# Patient Record
Sex: FEMALE | Race: WHITE | NOT HISPANIC OR LATINO | URBAN - METROPOLITAN AREA
[De-identification: names, ages, dates, MRNs, and addresses within clinical notes are randomized per-mention and may not be internally consistent; named-entity substitution may affect disease eponyms.]

---

## 2021-02-23 ENCOUNTER — APPOINTMENT (RX ONLY)
Dept: URBAN - METROPOLITAN AREA CLINIC 114 | Facility: CLINIC | Age: 61
Setting detail: DERMATOLOGY
End: 2021-02-23

## 2021-02-23 VITALS — TEMPERATURE: 97.2 F

## 2021-02-23 DIAGNOSIS — L85.3 XEROSIS CUTIS: ICD-10-CM

## 2021-02-23 DIAGNOSIS — L82.0 INFLAMED SEBORRHEIC KERATOSIS: ICD-10-CM

## 2021-02-23 DIAGNOSIS — L82.1 OTHER SEBORRHEIC KERATOSIS: ICD-10-CM

## 2021-02-23 DIAGNOSIS — L81.4 OTHER MELANIN HYPERPIGMENTATION: ICD-10-CM

## 2021-02-23 DIAGNOSIS — D18.0 HEMANGIOMA: ICD-10-CM

## 2021-02-23 PROBLEM — D18.01 HEMANGIOMA OF SKIN AND SUBCUTANEOUS TISSUE: Status: ACTIVE | Noted: 2021-02-23

## 2021-02-23 PROCEDURE — ? LIQUID NITROGEN

## 2021-02-23 PROCEDURE — 99203 OFFICE O/P NEW LOW 30 MIN: CPT | Mod: 25

## 2021-02-23 PROCEDURE — ? TREATMENT REGIMEN

## 2021-02-23 PROCEDURE — ? COUNSELING

## 2021-02-23 PROCEDURE — 17110 DESTRUCTION B9 LES UP TO 14: CPT

## 2021-02-23 ASSESSMENT — LOCATION SIMPLE DESCRIPTION DERM
LOCATION SIMPLE: LEFT LOWER BACK
LOCATION SIMPLE: LEFT THIGH
LOCATION SIMPLE: RIGHT POSTERIOR UPPER ARM
LOCATION SIMPLE: LEFT ANTERIOR NECK
LOCATION SIMPLE: LEFT POSTERIOR UPPER ARM
LOCATION SIMPLE: UPPER BACK
LOCATION SIMPLE: LEFT SHOULDER

## 2021-02-23 ASSESSMENT — LOCATION DETAILED DESCRIPTION DERM
LOCATION DETAILED: SUPERIOR THORACIC SPINE
LOCATION DETAILED: LEFT ANTERIOR DISTAL THIGH
LOCATION DETAILED: LEFT SUPERIOR MEDIAL MIDBACK
LOCATION DETAILED: LEFT INFERIOR ANTERIOR NECK
LOCATION DETAILED: LEFT POSTERIOR SHOULDER
LOCATION DETAILED: RIGHT PROXIMAL POSTERIOR UPPER ARM
LOCATION DETAILED: LEFT PROXIMAL POSTERIOR UPPER ARM

## 2021-02-23 ASSESSMENT — LOCATION ZONE DERM
LOCATION ZONE: ARM
LOCATION ZONE: TRUNK
LOCATION ZONE: LEG
LOCATION ZONE: NECK

## 2021-02-23 NOTE — PROCEDURE: LIQUID NITROGEN
Include Z78.9 (Other Specified Conditions Influencing Health Status) As An Associated Diagnosis?: No
Post-Care Instructions: I reviewed with the patient in detail post-care instructions. Patient is to wear sunprotection, and avoid picking at any of the treated lesions. Pt may apply Vaseline to crusted or scabbing areas.
Number Of Freeze-Thaw Cycles: 2 freeze-thaw cycles
Medical Necessity Information: It is in your best interest to select a reason for this procedure from the list below. All of these items fulfill various CMS LCD requirements except the new and changing color options.
Medical Necessity Clause: This procedure was medically necessary because the lesions are contagious and infectious
Detail Level: Simple
Duration Of Freeze Thaw-Cycle (Seconds): 5-10
Consent: The patient's consent was obtained including but not limited to risks of crusting, scabbing, blistering, scarring, darker or lighter pigmentary change, recurrence, incomplete removal and infection.
Render Post-Care Instructions In Note?: yes

## 2021-05-10 ENCOUNTER — HOSPITAL ENCOUNTER (OUTPATIENT)
Dept: GENERAL RADIOLOGY | Age: 61
Discharge: HOME | End: 2021-05-10
Payer: COMMERCIAL

## 2021-05-10 ENCOUNTER — HOSPITAL ENCOUNTER (OUTPATIENT)
Age: 61
Discharge: HOME | End: 2021-05-10
Payer: COMMERCIAL

## 2021-05-10 ENCOUNTER — OFFICE VISIT (OUTPATIENT)
Dept: RHEUMATOLOGY | Age: 61
End: 2021-05-10
Payer: COMMERCIAL

## 2021-05-10 ENCOUNTER — NURSE ONLY (OUTPATIENT)
Dept: LAB | Age: 61
End: 2021-05-10

## 2021-05-10 VITALS
HEIGHT: 61 IN | DIASTOLIC BLOOD PRESSURE: 76 MMHG | WEIGHT: 129 LBS | OXYGEN SATURATION: 97 % | BODY MASS INDEX: 24.35 KG/M2 | HEART RATE: 58 BPM | SYSTOLIC BLOOD PRESSURE: 128 MMHG

## 2021-05-10 DIAGNOSIS — M06.9 RHEUMATOID ARTHRITIS INVOLVING MULTIPLE SITES, UNSPECIFIED WHETHER RHEUMATOID FACTOR PRESENT (HCC): ICD-10-CM

## 2021-05-10 DIAGNOSIS — Z51.81 MEDICATION MONITORING ENCOUNTER: ICD-10-CM

## 2021-05-10 DIAGNOSIS — M06.30 RHEUMATOID NODULOSIS (HCC): ICD-10-CM

## 2021-05-10 DIAGNOSIS — M06.9 RHEUMATOID ARTHRITIS INVOLVING MULTIPLE SITES, UNSPECIFIED WHETHER RHEUMATOID FACTOR PRESENT (HCC): Primary | ICD-10-CM

## 2021-05-10 DIAGNOSIS — M17.0 OSTEOARTHRITIS OF BOTH KNEES, UNSPECIFIED OSTEOARTHRITIS TYPE: ICD-10-CM

## 2021-05-10 LAB
ALBUMIN SERPL-MCNC: 4.3 G/DL (ref 3.5–5.1)
ALP BLD-CCNC: 80 U/L (ref 38–126)
ALT SERPL-CCNC: 47 U/L (ref 11–66)
ANION GAP SERPL CALCULATED.3IONS-SCNC: 9 MEQ/L (ref 8–16)
AST SERPL-CCNC: 14 U/L (ref 5–40)
BASOPHILS # BLD: 0.4 %
BASOPHILS ABSOLUTE: 0 THOU/MM3 (ref 0–0.1)
BILIRUB SERPL-MCNC: 0.5 MG/DL (ref 0.3–1.2)
BUN BLDV-MCNC: 22 MG/DL (ref 7–22)
C-REACTIVE PROTEIN: < 0.3 MG/DL (ref 0–1)
CALCIUM SERPL-MCNC: 9.8 MG/DL (ref 8.5–10.5)
CHLORIDE BLD-SCNC: 103 MEQ/L (ref 98–111)
CO2: 29 MEQ/L (ref 23–33)
CREAT SERPL-MCNC: 0.7 MG/DL (ref 0.4–1.2)
EOSINOPHIL # BLD: 2.8 %
EOSINOPHILS ABSOLUTE: 0.2 THOU/MM3 (ref 0–0.4)
ERYTHROCYTE [DISTWIDTH] IN BLOOD BY AUTOMATED COUNT: 12.3 % (ref 11.5–14.5)
ERYTHROCYTE [DISTWIDTH] IN BLOOD BY AUTOMATED COUNT: 45.7 FL (ref 35–45)
GFR SERPL CREATININE-BSD FRML MDRD: 84 ML/MIN/1.73M2
GLUCOSE BLD-MCNC: 85 MG/DL (ref 70–108)
HAV IGM SER IA-ACNC: NEGATIVE
HCT VFR BLD CALC: 44.4 % (ref 37–47)
HEMOGLOBIN: 13.9 GM/DL (ref 12–16)
HEPATITIS B CORE IGM ANTIBODY: NEGATIVE
HEPATITIS B SURFACE ANTIGEN: NEGATIVE
HEPATITIS C ANTIBODY: NEGATIVE
IMMATURE GRANS (ABS): 0.02 THOU/MM3 (ref 0–0.07)
IMMATURE GRANULOCYTES: 0.2 %
LYMPHOCYTES # BLD: 36.9 %
LYMPHOCYTES ABSOLUTE: 3.1 THOU/MM3 (ref 1–4.8)
MCH RBC QN AUTO: 31.2 PG (ref 26–33)
MCHC RBC AUTO-ENTMCNC: 31.3 GM/DL (ref 32.2–35.5)
MCV RBC AUTO: 99.8 FL (ref 81–99)
MONOCYTES # BLD: 6.9 %
MONOCYTES ABSOLUTE: 0.6 THOU/MM3 (ref 0.4–1.3)
NUCLEATED RED BLOOD CELLS: 0 /100 WBC
PLATELET # BLD: 360 THOU/MM3 (ref 130–400)
PMV BLD AUTO: 10 FL (ref 9.4–12.4)
POTASSIUM SERPL-SCNC: 4 MEQ/L (ref 3.5–5.2)
RBC # BLD: 4.45 MILL/MM3 (ref 4.2–5.4)
SEDIMENTATION RATE, ERYTHROCYTE: 10 MM/HR (ref 0–20)
SEG NEUTROPHILS: 52.8 %
SEGMENTED NEUTROPHILS ABSOLUTE COUNT: 4.4 THOU/MM3 (ref 1.8–7.7)
SODIUM BLD-SCNC: 141 MEQ/L (ref 135–145)
TOTAL PROTEIN: 6.9 G/DL (ref 6.1–8)
WBC # BLD: 8.3 THOU/MM3 (ref 4.8–10.8)

## 2021-05-10 PROCEDURE — 99244 OFF/OP CNSLTJ NEW/EST MOD 40: CPT | Performed by: NURSE PRACTITIONER

## 2021-05-10 PROCEDURE — 73130 X-RAY EXAM OF HAND: CPT

## 2021-05-10 PROCEDURE — 73630 X-RAY EXAM OF FOOT: CPT

## 2021-05-10 PROCEDURE — 71046 X-RAY EXAM CHEST 2 VIEWS: CPT

## 2021-05-10 SDOH — HEALTH STABILITY: MENTAL HEALTH: HOW OFTEN DO YOU HAVE A DRINK CONTAINING ALCOHOL?: NEVER

## 2021-05-10 ASSESSMENT — ENCOUNTER SYMPTOMS
NAUSEA: 0
BACK PAIN: 0
ABDOMINAL PAIN: 0
SHORTNESS OF BREATH: 0
TROUBLE SWALLOWING: 0
DIARRHEA: 0
COUGH: 0
EYE ITCHING: 0
EYE PAIN: 0
CONSTIPATION: 0

## 2021-05-11 ENCOUNTER — TELEPHONE (OUTPATIENT)
Dept: RHEUMATOLOGY | Age: 61
End: 2021-05-11

## 2021-05-11 LAB — RHEUMATOID FACTOR: 219 IU/ML (ref 0–13)

## 2021-05-11 NOTE — TELEPHONE ENCOUNTER
----- Message from BANDAR García CNP sent at 5/11/2021 12:52 PM EDT -----  Xray right foot also with erosions, soft tissue swelling, bone thinning, small heel spur.

## 2021-05-11 NOTE — TELEPHONE ENCOUNTER
----- Message from BANDAR García CNP sent at 5/11/2021 12:53 PM EDT -----  [place]f [place] of the hands with previous surgical changes, erosions, and bone thinning.

## 2021-05-11 NOTE — TELEPHONE ENCOUNTER
----- Message from BANDAR Cruz - CNP sent at 5/11/2021 12:51 PM EDT -----  Annel Ramirez of left foot with erosions, soft tissue swelling, and bone thinning which can all be seen with rheumatoid arthritis.

## 2021-05-11 NOTE — TELEPHONE ENCOUNTER
----- Message from BANDAR Hernandez CNP sent at 5/11/2021 12:46 PM EDT -----  Chest xray with no abnormalities.

## 2021-05-11 NOTE — TELEPHONE ENCOUNTER
----- Message from BANDAR Rousseau CNP sent at 5/11/2021 12:50 PM EDT -----  One of the blood tests for rheumatoid arthritis was positive. Inflammatory markers, liver, kidneys and blood counts with no significant abnormalities.  Still awaiting results of one other blood test.

## 2021-05-12 LAB — CYCLIC CITRULLINATED PEPTIDE ANTIBODY IGG: 35 U/ML (ref 0–7)

## 2021-05-18 ENCOUNTER — TELEPHONE (OUTPATIENT)
Dept: RHEUMATOLOGY | Age: 61
End: 2021-05-18

## 2021-05-18 NOTE — TELEPHONE ENCOUNTER
----- Message from BANDAR Almanza CNP sent at 5/14/2021  8:17 AM EDT -----  The other blood test for rheumatoid arthritis was also positive.

## 2021-09-13 ENCOUNTER — OFFICE VISIT (OUTPATIENT)
Dept: RHEUMATOLOGY | Age: 61
End: 2021-09-13
Payer: COMMERCIAL

## 2021-09-13 VITALS
DIASTOLIC BLOOD PRESSURE: 72 MMHG | SYSTOLIC BLOOD PRESSURE: 124 MMHG | BODY MASS INDEX: 24.77 KG/M2 | HEART RATE: 78 BPM | OXYGEN SATURATION: 97 % | WEIGHT: 131.2 LBS | HEIGHT: 61 IN

## 2021-09-13 DIAGNOSIS — M17.0 OSTEOARTHRITIS OF BOTH KNEES, UNSPECIFIED OSTEOARTHRITIS TYPE: ICD-10-CM

## 2021-09-13 DIAGNOSIS — Z51.81 MEDICATION MONITORING ENCOUNTER: ICD-10-CM

## 2021-09-13 DIAGNOSIS — M06.9 RHEUMATOID ARTHRITIS INVOLVING MULTIPLE SITES, UNSPECIFIED WHETHER RHEUMATOID FACTOR PRESENT (HCC): Primary | ICD-10-CM

## 2021-09-13 DIAGNOSIS — M06.30 RHEUMATOID NODULOSIS (HCC): ICD-10-CM

## 2021-09-13 PROCEDURE — 99214 OFFICE O/P EST MOD 30 MIN: CPT | Performed by: NURSE PRACTITIONER

## 2021-09-13 RX ORDER — ETANERCEPT 25 MG/.5ML
SOLUTION SUBCUTANEOUS
COMMUNITY
Start: 2021-08-23 | End: 2022-06-30 | Stop reason: ALTCHOICE

## 2021-09-13 RX ORDER — ATORVASTATIN CALCIUM 20 MG/1
TABLET, FILM COATED ORAL
COMMUNITY
Start: 2021-07-31

## 2021-09-13 RX ORDER — MELOXICAM 15 MG/1
TABLET ORAL
COMMUNITY
Start: 2021-06-24 | End: 2022-06-30 | Stop reason: SDUPTHER

## 2021-09-13 RX ORDER — FOLIC ACID 1 MG/1
1 TABLET ORAL DAILY
Qty: 90 TABLET | Refills: 1 | Status: SHIPPED | OUTPATIENT
Start: 2021-09-13 | End: 2021-11-15

## 2021-09-13 RX ORDER — LISINOPRIL 20 MG/1
TABLET ORAL
COMMUNITY
Start: 2021-08-31

## 2021-09-13 ASSESSMENT — ENCOUNTER SYMPTOMS
ABDOMINAL PAIN: 0
EYE ITCHING: 0
DIARRHEA: 0
EYE PAIN: 0
BACK PAIN: 0
COUGH: 0
TROUBLE SWALLOWING: 0
CONSTIPATION: 0
NAUSEA: 0
SHORTNESS OF BREATH: 0

## 2021-09-13 NOTE — PROGRESS NOTES
[facility] RHEUMATOLOGY FOLLOW UP NOTE       Date Of Service: 9/13/2021  Provider: BANDAR Ron - CNP    Name: Lauris Epley   MRN: [id number]    Chief Complaint(s)     Chief Complaint   Patient presents with    3 Month Follow-Up     Rheumatoid Arthritis        History of Present Illness (HPI)     Lauris Epley  is a(n)61 y.o. female with a hx of osteoarthritis bilateral knees, rheumatoid arthritis, left hand knuckle replacement, left elbow nodule removal (2010), nodule removals bilateral feet (2005) here for the f/u evaluation of rheumatoid arthritis. Interval hx:    - scheduled for right knee replacement 9/25 at [facility]    pain affecting the knees  Pain on a scale 0-10: 3/10  Type of pain: constant  Timing: n/a  Aggravating factors: knees: walking  Alleviating factors: knees: rest    Associated symptoms:  denies swelling/  Redness/ warmth, denies AM stiffness       REVIEW OF SYSTEMS: (ROS)    Review of Systems   Constitutional: Negative for fatigue, fever and unexpected weight change. HENT: Negative for congestion and trouble swallowing. Eyes: Negative for pain and itching. Respiratory: Negative for cough and shortness of breath. Cardiovascular: Negative for chest pain and leg swelling. Gastrointestinal: Negative for abdominal pain, constipation, diarrhea and nausea. Endocrine: Negative for cold intolerance and heat intolerance. Genitourinary: Negative for difficulty urinating, frequency and urgency. Musculoskeletal: Positive for arthralgias. Negative for back pain and joint swelling. Skin: Negative for rash. Neurological: Negative for dizziness, weakness, numbness and headaches. Psychiatric/Behavioral: The patient is not nervous/anxious. PAST MEDICAL HISTORY    History reviewed. No pertinent past medical history. PAST SURGICAL HISTORY    History reviewed. No pertinent surgical history. FAMILY HISTORY    History reviewed.  No pertinent family 5th     Lower extremities:  HIPS nontender  KNEES nontender, no swelling  ANKLES nontender, + nodules left lateral ankle   FEET : nontender, no swelling       RAPID 3:   9/13/2021 --- RAPID 3: 0.3 + 3 + 0 = 3.3     Remission: <3  Low Disease Activity: <6  Moderate Disease Activity: >=6 and <=12  High Disease Activity: >12     LABS    CBC  Lab Results   Component Value Date    WBC 8.3 05/10/2021    RBC 4.45 05/10/2021    HGB 13.9 05/10/2021    HCT 44.4 05/10/2021    MCV 99.8 05/10/2021    MCH 31.2 05/10/2021    MCHC 31.3 05/10/2021     05/10/2021       CMP  Lab Results   Component Value Date    CALCIUM 9.8 05/10/2021    LABALBU 4.3 05/10/2021    PROT 6.9 05/10/2021     05/10/2021    K 4.0 05/10/2021    CO2 29 05/10/2021     05/10/2021    BUN 22 05/10/2021    CREATININE 0.7 05/10/2021    ALKPHOS 80 05/10/2021    ALT 47 05/10/2021    AST 14 05/10/2021       HgBA1c: No components found for: HGBA1C    No results found for: VITD25      No results found for: ANASCRN  No results found for: SSA  No results found for: SSB  No results found for: ANTI-SMITH  No results found for: DSDNAAB   No results found for: ANTIRNP  No results found for: C3, C4  No results found for: CCPAB  Lab Results   Component Value Date     (H) 05/10/2021       No components found for: CANCASCRN, APANCASCRN  Lab Results   Component Value Date    SEDRATE 10 05/10/2021     Lab Results   Component Value Date    CRP < 0.30 05/10/2021       RADIOLOGY:         ASSESSMENT/PLAN    Assessment   Plan     Seropositive rheumatoid arthritis +RF, + CCP  - diagnosed in 1995 with bilateral hand pain and swelling. Dr. Joan Ramos started methotrexate + enbrel (2005, sig relief). Methotrexate stopped in 2018 due to ? Remission. + nodulosis bilat elbows with prior removal as well as removals on feet. Exam with no tenderness, + swelling/warmth left wrist, active nodulosis elbows and left ankle.                - enbrel 25 mg subcu twice weekly - restart methotrexate 10 mg PO weekly & folic acid 1 mg daily. Side effects include GI intolerance, alopecia, fatigue, low blood counts, increased risk of infection. RARE: nodulosis, increased risk of lymphoma and non melanoma skin cancer    Rheumatoid nodulosis (HCC)  - active elbows, left ankle     Osteoarthritis of both knees, unspecified osteoarthritis type  - prior tx: cortisone injection (relief), synvisc (relief),               - following with Dr. Valorie ALSTONulma Aristides)- scheduled for replacement     Medication monitoring    - CBC, CMP, sed rate, CRP q 4 weeks x3 w/ MTX start   - need updated TB gold- order placed    No follow-ups on file. Electronically signed by Sheryle Dus, APRN - CNP on 9/13/2021 at 8:23 AM    New Prescriptions    No medications on file       Thank you for allowing me to participate in the care of this patient. Please call if there are any questions.

## 2021-10-28 LAB
ALBUMIN SERPL-MCNC: 4.3 G/DL
ALP BLD-CCNC: 87 U/L
ALT SERPL-CCNC: 17 U/L
ANION GAP SERPL CALCULATED.3IONS-SCNC: NORMAL MMOL/L
AST SERPL-CCNC: 22 U/L
BASOPHILS ABSOLUTE: 0.1 /ΜL
BASOPHILS RELATIVE PERCENT: 1 %
BILIRUB SERPL-MCNC: 0.6 MG/DL (ref 0.1–1.4)
BUN BLDV-MCNC: 11 MG/DL
C-REACTIVE PROTEIN: 1
CALCIUM SERPL-MCNC: 9.4 MG/DL
CHLORIDE BLD-SCNC: 103 MMOL/L
CO2: 23 MMOL/L
CREAT SERPL-MCNC: 0.64 MG/DL
EOSINOPHILS ABSOLUTE: 0.3 /ΜL
EOSINOPHILS RELATIVE PERCENT: 4 %
GFR CALCULATED: 95
GLUCOSE BLD-MCNC: 135 MG/DL
HCT VFR BLD CALC: 37.3 % (ref 36–46)
HEMOGLOBIN: 12.7 G/DL (ref 12–16)
LYMPHOCYTES ABSOLUTE: 2.1 /ΜL
LYMPHOCYTES RELATIVE PERCENT: 35 %
MCH RBC QN AUTO: 32 PG
MCHC RBC AUTO-ENTMCNC: 34 G/DL
MCV RBC AUTO: 94 FL
MONOCYTES ABSOLUTE: 0.5 /ΜL
MONOCYTES RELATIVE PERCENT: 7 %
NEUTROPHILS ABSOLUTE: 3.2 /ΜL
NEUTROPHILS RELATIVE PERCENT: 53 %
PDW BLD-RTO: 11.9 %
PLATELET # BLD: 380 K/ΜL
PMV BLD AUTO: NORMAL FL
POTASSIUM SERPL-SCNC: 3.9 MMOL/L
RBC # BLD: 3.97 10^6/ΜL
SEDIMENTATION RATE, ERYTHROCYTE: 25
SODIUM BLD-SCNC: 140 MMOL/L
TOTAL PROTEIN: 6.6
WBC # BLD: 6.1 10^3/ML

## 2021-11-15 ENCOUNTER — OFFICE VISIT (OUTPATIENT)
Dept: RHEUMATOLOGY | Age: 61
End: 2021-11-15
Payer: COMMERCIAL

## 2021-11-15 VITALS
OXYGEN SATURATION: 98 % | SYSTOLIC BLOOD PRESSURE: 128 MMHG | HEIGHT: 61 IN | BODY MASS INDEX: 23.22 KG/M2 | DIASTOLIC BLOOD PRESSURE: 74 MMHG | HEART RATE: 105 BPM | WEIGHT: 123 LBS

## 2021-11-15 DIAGNOSIS — M06.9 RHEUMATOID ARTHRITIS INVOLVING MULTIPLE SITES, UNSPECIFIED WHETHER RHEUMATOID FACTOR PRESENT (HCC): Primary | ICD-10-CM

## 2021-11-15 DIAGNOSIS — M06.30 RHEUMATOID NODULOSIS (HCC): ICD-10-CM

## 2021-11-15 DIAGNOSIS — M17.0 OSTEOARTHRITIS OF BOTH KNEES, UNSPECIFIED OSTEOARTHRITIS TYPE: ICD-10-CM

## 2021-11-15 DIAGNOSIS — Z51.81 MEDICATION MONITORING ENCOUNTER: ICD-10-CM

## 2021-11-15 PROCEDURE — 99214 OFFICE O/P EST MOD 30 MIN: CPT | Performed by: INTERNAL MEDICINE

## 2021-11-15 ASSESSMENT — JOINT PAIN
TOTAL NUMBER OF SWOLLEN JOINTS: 0
TOTAL NUMBER OF TENDER JOINTS: 0

## 2021-11-15 ASSESSMENT — ENCOUNTER SYMPTOMS
COUGH: 0
WHEEZING: 0
SHORTNESS OF BREATH: 0
EYE REDNESS: 0
VOMITING: 0
EYE PAIN: 0
EYES NEGATIVE: 1
NAUSEA: 0
CONSTIPATION: 0
DIARRHEA: 0

## 2021-11-15 NOTE — PROGRESS NOTES
[facility] RHEUMATOLOGY FOLLOW UP NOTE       Date Of Service: 11/15/2021  Provider: Timothy Newell DO ,   PCP: Jeff Iverson MD   Name: Brock Davies   MRN: [id number]        History of Present Illness (HPI)     Chief Complaint   Patient presents with    Follow-up     2 MONTH F/U RA        Brock Davies  is a(n)61 y.o. female with a hx of osteoarthritis bilateral knees, rheumatoid arthritis, left hand knuckle replacement, left elbow nodule removal (2010), nodule removals bilateral feet (2005) here for the f/u evaluation of rheumatoid arthritis. Osteoarthritis  Knee:  scheduled for right knee replacement 9/25 at [facility] - decreased pain. Using mobic 15mg daily and diclofenac 50mg daily (from Dr. Romeo Anthony)     Rheumatoid arthritis - taking Enbrel 25mg twice weekly -- off enbrel for 3 weeks after surgery , restarted mid octobers. Denies start of Methotrexate or RA flares since the last evaluation. No other joint pains. [place] - active bilat elbows and ankle. Mild AM stiffness. REVIEW OF SYSTEMS: (ROS)    Review of Systems   Constitutional: Negative for diaphoresis, fatigue and fever. HENT: Negative for congestion, hearing loss and nosebleeds. Eyes: Negative. Negative for pain and redness. Respiratory: Negative for cough, shortness of breath and wheezing. Cardiovascular: Negative. Negative for chest pain. Gastrointestinal: Negative for constipation, diarrhea, nausea and vomiting. Genitourinary: Negative for difficulty urinating, frequency and hematuria. Musculoskeletal: Negative for myalgias. Skin: Negative for rash. Neurological: Negative for dizziness, weakness and headaches. Hematological: Does not bruise/bleed easily. Psychiatric/Behavioral: Negative for sleep disturbance. The patient is not nervous/anxious. PAST MEDICAL HISTORY     has a past medical history of High cholesterol, Hypertension, and RA (rheumatoid arthritis) ([facility] [address]      SOCIAL HISTORY reports that she has never smoked. She has never used smokeless tobacco. She reports that she does not drink alcohol and does not use drugs. ALLERGIES   No Known Allergies    CURRENT MEDICATIONS      Current Outpatient Medications:     methotrexate (RHEUMATREX) 2.5 MG chemo tablet, Take 4 tablets by mouth once a week, Disp: 16 tablet, Rfl: 0    ENBREL 25 MG/0.5ML SOLN, , Disp: , Rfl:     meloxicam (MOBIC) 15 MG tablet, TAKE 1 TABLET BY MOUTH EVERY DAY, Disp: , Rfl:     lisinopril (PRINIVIL;ZESTRIL) 20 MG tablet, TAKE 1 TABLET BY MOUTH EVERY DAY AS DIRECTED, Disp: , Rfl:     atorvastatin (LIPITOR) 20 MG tablet, TAKE 1 TABLET BY MOUTH ONCE DAILY, Disp: , Rfl:     folic acid (FOLVITE) 1 MG tablet, Take 1 tablet by mouth daily, Disp: 90 tablet, Rfl: 1    PHYSICAL EXAMINATION / OBJECTIVE     Objective:  /74 (Site: Left Upper Arm, Position: Sitting, Cuff Size: Medium Adult)   Pulse 105   Ht 5' 0.98\" (1.549 m)   Wt 123 lb (55.8 kg)   SpO2 98%   BMI 23.25 kg/m²     Physical Exam      General Appearance:  AAO x 3 ,  well-developed and well nourished  Head: NCAT  Eyes: No abnormalities. ,  Sclera non-icteric,   Ears / Nose:  normal  appearance  ears and nose. No active drainage   Mouth:  MMM, ears w/o deformities  Neck: No jugular venous distention, appears symmetric,    Lymph:   Pulmonary/Chest: CTA bilateral ,  symmetric chest expansion. Cardiovascular: Normal S1 and S2, NO murmur, rub, gallop  Neurologic: Speech normal, no facial droop,  Skin: NO rash on exposed skin. - nodulsosis bilat elbows, right thumb, right 3rd distal finger , left lateral ankles, left 1st MTP.    - + callus bilat plantar aspect of MTPs. Musculoskeletal:   Non-tender. , no swelling,   Mild sawn neck changes bilat 5th finger. Lower extremities:   KNEES  - warmth right > left, swelling right.    ANKLES Non-tender,     FEET : Non-tender       RAPID 3  11/15/2021 --- RAPID 3:  1.3 + 2 + 0 = 3.3    Physical Exam          PORRAS-28 (ESR): 2.25 (Remission)  PORRAS-28 (CRP): 1.21 (Remission)          Remission: <3  Low Disease Activity: <6  Moderate Disease Activity: >=6 and <=12  High Disease Activity: >12     LABS      Lab Results   Component Value Date    WBC 6.1 10/28/2021    HGB 12.7 10/28/2021    MCV 94 10/28/2021    MCHC 34.0 10/28/2021    RDW 11.9 10/28/2021     10/28/2021    NEUTROABS 3.2 10/28/2021    LYMPHSABS 2.1 10/28/2021    EOSABS 0.3 10/28/2021    BASOSABS 0.1 10/28/2021         Chemistry        Component Value Date/Time     10/28/2021 0000    K 3.9 10/28/2021 0000     10/28/2021 0000    CO2 23 10/28/2021 0000    BUN 11 10/28/2021 0000    CREATININE 0.64 10/28/2021 0000        Component Value Date/Time    CALCIUM 9.4 10/28/2021 0000    ALKPHOS 87 10/28/2021 0000    AST 22 10/28/2021 0000    ALT 17 10/28/2021 0000    BILITOT 0.6 10/28/2021 0000            Lab Results   Component Value Date    SEDRATE 25 10/28/2021    SEDRATE 10 05/10/2021    CRP 1 10/28/2021    CRP < 0.30 05/10/2021       Lab Results   Component Value Date     (H) 05/10/2021             RADIOLOGY / PROCEDURES:     ASSESSMENT/PLAN:     1. Rheumatoid arthritis involving multiple sites, unspecified whether rheumatoid factor present ([facility] [address]    2. Rheumatoid nodulosis ([facility] [address]    3. Osteoarthritis of both knees, unspecified osteoarthritis type    4. Medication monitoring encounter      # Seropositive rheumatoid arthritis +RF, + CCP -  Remission   # Rheumatoid nodulosis - active  Elbows, hands, left ankle. Left foot. - diagnosed in 1995 with bilateral hand pain and swelling. Dr. Vannessa Pruett started methotrexate + enbrel (2005, sig relief). Methotrexate stopped in 2018 due to ? Remission. + nodulosis bilat elbows with prior removal as well as removals on feet.                 - enbrel 25 mg subcu twice weekly (burning with weekly dosing)               - hold on starting methotrexate 10 mg PO weekly & folic acid 1 mg daily (sept 2021) - as patient is in remission.    - d/c the diclofenac b/c on mobic 15 mg daily. - discussed side effects of NSAIDS - we have discussed the use of nonsteroidal anti-inflammatory medications which include but not limited to Gastrointestinal toxicity (such as ulceration and bleeding), renal toxicity, liver toxicity, and potential cardiovascular toxicity. # Osteoarthritis of both knees, unspecified osteoarthritis type   - prior tx: cortisone injection (relief), synvisc (relief),               - following with Dr. Katt Jacobsen)- s/p replacement sept 2021. - improvement of pain. Medication monitoring                - CBC, CMP, sed rate, CRP q 4 months    - tb gold negative 11/5/2021.    - labs 10/28/2021 - stable and reviewed w/ patient. Return in about 6 months (around 5/15/2022). New Prescriptions    No medications on file       11/15/2021    Electronically signed by Radha Fletcher DO on 11/15/21 at 1:56 PM EST  Please contact the office if you have any questions or change of symptoms.

## 2021-11-24 RX ORDER — METHOTREXATE 2.5 MG/1
TABLET ORAL
Qty: 16 TABLET | Refills: 0 | OUTPATIENT
Start: 2021-11-24

## 2022-03-07 RX ORDER — FOLIC ACID 1 MG/1
TABLET ORAL
Qty: 90 TABLET | Refills: 1 | OUTPATIENT
Start: 2022-03-07

## 2022-06-30 ENCOUNTER — TELEPHONE (OUTPATIENT)
Dept: RHEUMATOLOGY | Age: 62
End: 2022-06-30

## 2022-06-30 ENCOUNTER — OFFICE VISIT (OUTPATIENT)
Dept: RHEUMATOLOGY | Age: 62
End: 2022-06-30
Payer: COMMERCIAL

## 2022-06-30 VITALS
HEIGHT: 61 IN | BODY MASS INDEX: 22.28 KG/M2 | HEART RATE: 81 BPM | WEIGHT: 118 LBS | DIASTOLIC BLOOD PRESSURE: 80 MMHG | SYSTOLIC BLOOD PRESSURE: 118 MMHG | OXYGEN SATURATION: 97 %

## 2022-06-30 DIAGNOSIS — M06.30 RHEUMATOID NODULOSIS (HCC): Primary | ICD-10-CM

## 2022-06-30 DIAGNOSIS — Z51.81 MEDICATION MONITORING ENCOUNTER: ICD-10-CM

## 2022-06-30 DIAGNOSIS — Z78.0 POSTMENOPAUSAL: ICD-10-CM

## 2022-06-30 DIAGNOSIS — M17.0 OSTEOARTHRITIS OF BOTH KNEES, UNSPECIFIED OSTEOARTHRITIS TYPE: ICD-10-CM

## 2022-06-30 DIAGNOSIS — M05.79 RHEUMATOID ARTHRITIS INVOLVING MULTIPLE SITES WITH POSITIVE RHEUMATOID FACTOR (HCC): ICD-10-CM

## 2022-06-30 LAB
ALBUMIN SERPL-MCNC: 4.3 G/DL
ALP BLD-CCNC: 127 U/L
ALT SERPL-CCNC: 41 U/L
ANION GAP SERPL CALCULATED.3IONS-SCNC: NORMAL MMOL/L
AST SERPL-CCNC: 30 U/L
BASOPHILS ABSOLUTE: 0.1 /ΜL
BASOPHILS RELATIVE PERCENT: 1 %
BILIRUB SERPL-MCNC: 0.9 MG/DL (ref 0.1–1.4)
BUN BLDV-MCNC: 17 MG/DL
C-REACTIVE PROTEIN: 8
CALCIUM SERPL-MCNC: 9.4 MG/DL
CHLORIDE BLD-SCNC: 104 MMOL/L
CO2: 21 MMOL/L
CREAT SERPL-MCNC: 0.61 MG/DL
EOSINOPHILS ABSOLUTE: 0.2 /ΜL
EOSINOPHILS RELATIVE PERCENT: 3 %
GFR CALCULATED: 100
GLUCOSE BLD-MCNC: 95 MG/DL
HCT VFR BLD CALC: 39.7 % (ref 36–46)
HEMOGLOBIN: 13.3 G/DL (ref 12–16)
LYMPHOCYTES ABSOLUTE: 1.2 /ΜL
LYMPHOCYTES RELATIVE PERCENT: 15 %
MCH RBC QN AUTO: 32 PG
MCHC RBC AUTO-ENTMCNC: 33.5 G/DL
MCV RBC AUTO: 95 FL
MONOCYTES ABSOLUTE: 0.6 /ΜL
MONOCYTES RELATIVE PERCENT: 7 %
NEUTROPHILS ABSOLUTE: 5.7 /ΜL
NEUTROPHILS RELATIVE PERCENT: 74 %
PDW BLD-RTO: 12.4 %
PLATELET # BLD: 361 K/ΜL
PMV BLD AUTO: NORMAL FL
POTASSIUM SERPL-SCNC: 4.4 MMOL/L
RBC # BLD: 4.16 10^6/ΜL
SEDIMENTATION RATE, ERYTHROCYTE: 37
SODIUM BLD-SCNC: 140 MMOL/L
TOTAL PROTEIN: 6.6
WBC # BLD: 7.7 10^3/ML

## 2022-06-30 PROCEDURE — 99214 OFFICE O/P EST MOD 30 MIN: CPT | Performed by: INTERNAL MEDICINE

## 2022-06-30 RX ORDER — ACETAMINOPHEN 325 MG/1
650 TABLET ORAL ONCE
Status: CANCELLED | OUTPATIENT
Start: 2022-06-30

## 2022-06-30 RX ORDER — FAMOTIDINE 10 MG/ML
20 INJECTION, SOLUTION INTRAVENOUS
Status: CANCELLED | OUTPATIENT
Start: 2022-06-30

## 2022-06-30 RX ORDER — ALBUTEROL SULFATE 90 UG/1
4 AEROSOL, METERED RESPIRATORY (INHALATION) PRN
Status: CANCELLED | OUTPATIENT
Start: 2022-06-30

## 2022-06-30 RX ORDER — DIPHENHYDRAMINE HCL 25 MG
25 TABLET ORAL ONCE
Status: CANCELLED | OUTPATIENT
Start: 2022-06-30

## 2022-06-30 RX ORDER — SODIUM CHLORIDE 9 MG/ML
INJECTION, SOLUTION INTRAVENOUS ONCE
Status: CANCELLED | OUTPATIENT
Start: 2022-06-30 | End: 2022-06-30

## 2022-06-30 RX ORDER — DIPHENHYDRAMINE HYDROCHLORIDE 50 MG/ML
50 INJECTION INTRAMUSCULAR; INTRAVENOUS
Status: CANCELLED | OUTPATIENT
Start: 2022-06-30

## 2022-06-30 RX ORDER — CETIRIZINE HYDROCHLORIDE 5 MG/1
10 TABLET ORAL ONCE
Status: CANCELLED | OUTPATIENT
Start: 2022-06-30

## 2022-06-30 RX ORDER — ACETAMINOPHEN 325 MG/1
650 TABLET ORAL
Status: CANCELLED | OUTPATIENT
Start: 2022-06-30

## 2022-06-30 RX ORDER — MELOXICAM 15 MG/1
TABLET ORAL
Qty: 90 TABLET | Refills: 1 | Status: SHIPPED | OUTPATIENT
Start: 2022-06-30

## 2022-06-30 RX ORDER — HEPARIN SODIUM (PORCINE) LOCK FLUSH IV SOLN 100 UNIT/ML 100 UNIT/ML
500 SOLUTION INTRAVENOUS PRN
Status: CANCELLED | OUTPATIENT
Start: 2022-06-30

## 2022-06-30 RX ORDER — SODIUM CHLORIDE 9 MG/ML
INJECTION, SOLUTION INTRAVENOUS CONTINUOUS
Status: CANCELLED | OUTPATIENT
Start: 2022-06-30

## 2022-06-30 RX ORDER — CETIRIZINE HYDROCHLORIDE 10 MG/1
10 TABLET ORAL ONCE
Status: CANCELLED | OUTPATIENT
Start: 2022-06-30

## 2022-06-30 RX ORDER — ONDANSETRON 2 MG/ML
8 INJECTION INTRAMUSCULAR; INTRAVENOUS
Status: CANCELLED | OUTPATIENT
Start: 2022-06-30

## 2022-06-30 RX ORDER — SODIUM CHLORIDE 0.9 % (FLUSH) 0.9 %
5-40 SYRINGE (ML) INJECTION PRN
Status: CANCELLED | OUTPATIENT
Start: 2022-06-30

## 2022-06-30 RX ORDER — FOLIC ACID 1 MG/1
1 TABLET ORAL DAILY
Qty: 90 TABLET | Refills: 1 | Status: SHIPPED | OUTPATIENT
Start: 2022-06-30 | End: 2022-07-01 | Stop reason: SDUPTHER

## 2022-06-30 RX ORDER — SODIUM CHLORIDE 9 MG/ML
5-250 INJECTION, SOLUTION INTRAVENOUS PRN
Status: CANCELLED | OUTPATIENT
Start: 2022-06-30

## 2022-06-30 RX ORDER — EPINEPHRINE 1 MG/ML
0.3 INJECTION, SOLUTION, CONCENTRATE INTRAVENOUS PRN
Status: CANCELLED | OUTPATIENT
Start: 2022-06-30

## 2022-06-30 ASSESSMENT — ENCOUNTER SYMPTOMS
COUGH: 0
CONSTIPATION: 0
SHORTNESS OF BREATH: 0
WHEEZING: 0
EYES NEGATIVE: 1
NAUSEA: 0
VOMITING: 0
DIARRHEA: 0
EYE REDNESS: 0
EYE PAIN: 0

## 2022-06-30 ASSESSMENT — JOINT PAIN
TOTAL NUMBER OF TENDER JOINTS: 8
TOTAL NUMBER OF SWOLLEN JOINTS: 8

## 2022-06-30 NOTE — PROGRESS NOTES
[facility] NOTE       Date Of Service: 6/30/2022  Provider: Karlene Qureshi DO ,   PCP: Soumya Carpenter MD   Name: Celso Zee   MRN: [id number]        History of Present Illness (HPI)     Chief Complaint   Patient presents with    6 Month Follow-Up        Celso Zee  is a(n)62 y.o. female with a hx of osteoarthritis bilateral knees, rheumatoid arthritis, left hand knuckle replacement, left elbow nodule removal (2010), nodule removals bilateral feet (2005) here for the f/u evaluation of rheumatoid arthritis. , osteoarthritis knees, and medication monitoring     RA - off enbrel for the past month , not on Methotrexate w/ worsening of the joint pains and increasd swelling of hands and feet. Pain up to 8/10 , affecting the bilat hands, wrists, shoulder, knees (left), hip (right), fet and lower back. AM stiffness ~ 15 minutes. Denies new RA nodules or deformities. + mild weakness bilat hands w/  strength     Osteoarthritis  Knee:  Using mobic 15mg daily prn. REVIEW OF SYSTEMS: (ROS)    Review of Systems   Constitutional: Negative for diaphoresis, fatigue and fever. HENT: Negative for congestion, hearing loss and nosebleeds. Eyes: Negative. Negative for pain and redness. Respiratory: Negative for cough, shortness of breath and wheezing. Cardiovascular: Negative. Negative for chest pain. Gastrointestinal: Negative for constipation, diarrhea, nausea and vomiting. Genitourinary: Negative for difficulty urinating, frequency and hematuria. Musculoskeletal: Negative for myalgias. Skin: Negative for rash. Neurological: Negative for dizziness, weakness and headaches. Hematological: Does not bruise/bleed easily. Psychiatric/Behavioral: Negative for sleep disturbance. The patient is not nervous/anxious. PAST MEDICAL HISTORY     has a past medical history of High cholesterol, Hypertension, and RA (rheumatoid arthritis) ([facility] [address]      SOCIAL HISTORY reports that she has never smoked. She has never used smokeless tobacco. She reports that she does not drink alcohol and does not use drugs. ALLERGIES   No Known Allergies    CURRENT MEDICATIONS      Current Outpatient Medications:     ENBREL 25 MG/0.5ML SOLN, , Disp: , Rfl:     meloxicam (MOBIC) 15 MG tablet, TAKE 1 TABLET BY MOUTH EVERY DAY, Disp: , Rfl:     lisinopril (PRINIVIL;ZESTRIL) 20 MG tablet, TAKE 1 TABLET BY MOUTH EVERY DAY AS DIRECTED, Disp: , Rfl:     atorvastatin (LIPITOR) 20 MG tablet, TAKE 1 TABLET BY MOUTH ONCE DAILY, Disp: , Rfl:     PHYSICAL EXAMINATION / OBJECTIVE     Objective:  /80 (Site: Left Upper Arm, Position: Sitting)   Pulse 81   Ht 5' 0.98\" (1.549 m)   Wt 118 lb (53.5 kg)   SpO2 97%   BMI 22.31 kg/m²     Physical Exam  Constitutional:       General: She is not in acute distress. Appearance: She is well-developed. She is not diaphoretic. HENT:      Head: Normocephalic and atraumatic. Eyes:      General: No scleral icterus. Conjunctiva/sclera: Conjunctivae normal.   Cardiovascular:      Rate and Rhythm: Normal rate and regular rhythm. Heart sounds: Normal heart sounds. Pulmonary:      Effort: Pulmonary effort is normal. No respiratory distress. Breath sounds: Normal breath sounds. No wheezing or rales. Musculoskeletal:      Cervical back: Normal range of motion and neck supple. Lymphadenopathy:      Cervical: No cervical adenopathy. Skin:     General: Skin is warm and dry. Comments:  - nodulsosis bilat elbows, right thumb, right 3rd distal finger , left lateral ankles, left 1st MTP.    - + nodules bilat plantar aspect of MTPs. Neurological:      Mental Status: She is alert and oriented to person, place, and time. Psychiatric:         Behavior: Behavior normal.       Musculoskeletal:   Non-tender. , no swelling,   Mild sawn neck changes bilat 5th finger. Swelling and tender joint as belwo.      Lower extremities:   FEET : nodules along plantar aspect of 1st and 5th MTPs bilat. RAPID 3  6/30/2022 --- RAPID 3:  1.3 + 2 + 0 = 3.3    Physical Exam          PORRAS-28 (ESR): 4.71 (Moderate disease activity)  PORRAS-28 (CRP): 3.67 (Moderate disease activity)          Remission: <3  Low Disease Activity: <6  Moderate Disease Activity: >=6 and <=12  High Disease Activity: >12     LABS      Lab Results   Component Value Date    WBC 6.1 10/28/2021    HGB 12.7 10/28/2021    MCV 94 10/28/2021    MCHC 34.0 10/28/2021    RDW 11.9 10/28/2021     10/28/2021    NEUTROABS 3.2 10/28/2021    LYMPHSABS 2.1 10/28/2021    EOSABS 0.3 10/28/2021    BASOSABS 0.1 10/28/2021         Chemistry        Component Value Date/Time     10/28/2021 0000    K 3.9 10/28/2021 0000     10/28/2021 0000    CO2 23 10/28/2021 0000    BUN 11 10/28/2021 0000    CREATININE 0.64 10/28/2021 0000        Component Value Date/Time    CALCIUM 9.4 10/28/2021 0000    ALKPHOS 87 10/28/2021 0000    AST 22 10/28/2021 0000    ALT 17 10/28/2021 0000    BILITOT 0.6 10/28/2021 0000            Lab Results   Component Value Date    SEDRATE 25 10/28/2021    SEDRATE 10 05/10/2021    CRP 1 10/28/2021    CRP < 0.30 05/10/2021       Lab Results   Component Value Date     (H) 05/10/2021             RADIOLOGY / PROCEDURES:     ASSESSMENT/PLAN:     1. Rheumatoid nodulosis ([address]    2. Rheumatoid arthritis involving multiple sites with positive rheumatoid factor (HCC)    3. Osteoarthritis of both knees, unspecified osteoarthritis type    4. Medication monitoring encounter    5. Postmenopausal      # Seropositive rheumatoid arthritis +RF, + CCP -  Remission   # Rheumatoid nodulosis - active  Elbows, hands, left ankle. Left foot. - diagnosed in 1995 with bilateral hand pain and swelling. Dr. Gloria Heath started methotrexate + enbrel (2005, sig relief). Methotrexate stopped in 2018 due to ? Remission. + nodulosis bilat elbows with prior removal as well as removals on feet.     - restart Methotrexate 10mg po weekly and folic acid 1mg daily    - d/c enbrel 25mg twice week b/c burning.    - will pursue Remiciade/infliximab 3 mg/kg q 8 weeks after loading dose b/c high dz activit    - mobic. # Osteoarthritis of both knees, unspecified osteoarthritis type   - prior tx: cortisone injection (relief), synvisc (relief),               - following with Dr. Noelle Cedeno)- s/p replacement sept 2021. - improvement of pain. # Medication monitoring                - CBC, CMP, sed rate, CRP q 4 weeks x 3    - tb gold negative 11/5/2021. Return in about 3 months (around 9/30/2022). New Prescriptions    No medications on file       6/30/2022    Electronically signed by Regina Molina DO on 11/15/21 at 1:56 PM EST  Please contact the office if you have any questions or change of symptoms.

## 2022-06-30 NOTE — TELEPHONE ENCOUNTER
Meloxicam can increase methotrexate levels. We are currently monitoring closely for potential interactions/side effects.

## 2022-07-01 DIAGNOSIS — M05.79 RHEUMATOID ARTHRITIS INVOLVING MULTIPLE SITES WITH POSITIVE RHEUMATOID FACTOR (HCC): ICD-10-CM

## 2022-07-01 DIAGNOSIS — M06.30 RHEUMATOID NODULOSIS (HCC): ICD-10-CM

## 2022-07-01 RX ORDER — FOLIC ACID 1 MG/1
1 TABLET ORAL DAILY
Qty: 60 TABLET | Refills: 2 | Status: SHIPPED | OUTPATIENT
Start: 2022-07-01

## 2022-07-20 ENCOUNTER — TELEPHONE (OUTPATIENT)
Dept: RHEUMATOLOGY | Age: 62
End: 2022-07-20

## 2022-07-25 LAB
ALBUMIN SERPL-MCNC: 4 G/DL
ALP BLD-CCNC: 88 U/L
ALT SERPL-CCNC: 14 U/L
ANION GAP SERPL CALCULATED.3IONS-SCNC: NORMAL MMOL/L
AST SERPL-CCNC: 18 U/L
BASOPHILS ABSOLUTE: 0 /ΜL
BASOPHILS RELATIVE PERCENT: 1 %
BILIRUB SERPL-MCNC: 0.6 MG/DL (ref 0.1–1.4)
BUN BLDV-MCNC: 14 MG/DL
C-REACTIVE PROTEIN: 1
CALCIUM SERPL-MCNC: 9.3 MG/DL
CHLORIDE BLD-SCNC: 104 MMOL/L
CO2: 25 MMOL/L
CREAT SERPL-MCNC: 0.65 MG/DL
EOSINOPHILS ABSOLUTE: 0.3 /ΜL
EOSINOPHILS RELATIVE PERCENT: 6 %
GFR CALCULATED: 98
GLUCOSE BLD-MCNC: 90 MG/DL
HCT VFR BLD CALC: 39.3 % (ref 36–46)
HEMOGLOBIN: 13 G/DL (ref 12–16)
LYMPHOCYTES ABSOLUTE: 1.4 /ΜL
LYMPHOCYTES RELATIVE PERCENT: 24 %
MCH RBC QN AUTO: 31.6 PG
MCHC RBC AUTO-ENTMCNC: 33.1 G/DL
MCV RBC AUTO: 95 FL
MONOCYTES ABSOLUTE: 0.5 /ΜL
MONOCYTES RELATIVE PERCENT: 9 %
NEUTROPHILS ABSOLUTE: 3.5 /ΜL
NEUTROPHILS RELATIVE PERCENT: 60 %
PDW BLD-RTO: 12.5 %
PLATELET # BLD: 324 K/ΜL
PMV BLD AUTO: NORMAL FL
POTASSIUM SERPL-SCNC: 4.5 MMOL/L
RBC # BLD: 4.12 10^6/ΜL
SEDIMENTATION RATE, ERYTHROCYTE: 8
SODIUM BLD-SCNC: 141 MMOL/L
TOTAL PROTEIN: 6.3
WBC # BLD: 5.8 10^3/ML

## 2022-07-26 DIAGNOSIS — M06.30 RHEUMATOID NODULOSIS (HCC): ICD-10-CM

## 2022-07-26 DIAGNOSIS — M05.79 RHEUMATOID ARTHRITIS INVOLVING MULTIPLE SITES WITH POSITIVE RHEUMATOID FACTOR (HCC): ICD-10-CM

## 2022-07-28 DIAGNOSIS — M05.79 RHEUMATOID ARTHRITIS INVOLVING MULTIPLE SITES WITH POSITIVE RHEUMATOID FACTOR (HCC): ICD-10-CM

## 2022-07-28 DIAGNOSIS — M06.30 RHEUMATOID NODULOSIS (HCC): ICD-10-CM

## 2022-07-28 NOTE — TELEPHONE ENCOUNTER
Infusion auth dept called and stated that this pt's insurance will only approve home infusion where her insurance would pay for someone to come into her home to administer or free standing facility for administration of infusion. Please advise.

## 2022-07-28 NOTE — TELEPHONE ENCOUNTER
Please contact the prior authorization specialist and have them resubmit the medication for Inflectra or infliximab as these are the preferred agent from the insurance company.

## 2022-08-03 NOTE — TELEPHONE ENCOUNTER
Spoke with Channel from [place] and she stated that since the pt's inflectra was being denied d/t site of care she recommended either home infusions that insurance would cover or an independent facility. She is faxing a decision letter that will give options on places that she can have infusion done.

## 2022-08-26 LAB
ALBUMIN SERPL-MCNC: 4.4 G/DL
ALP BLD-CCNC: 94 U/L
ALT SERPL-CCNC: 17 U/L
ANION GAP SERPL CALCULATED.3IONS-SCNC: NORMAL MMOL/L
AST SERPL-CCNC: 22 U/L
BASOPHILS ABSOLUTE: 0.1 /ΜL
BASOPHILS RELATIVE PERCENT: 1 %
BILIRUB SERPL-MCNC: 0.9 MG/DL (ref 0.1–1.4)
BUN BLDV-MCNC: 17 MG/DL
C-REACTIVE PROTEIN: 1
CALCIUM SERPL-MCNC: 9.5 MG/DL
CHLORIDE BLD-SCNC: 101 MMOL/L
CO2: 23 MMOL/L
CREAT SERPL-MCNC: 0.76 MG/DL
EOSINOPHILS ABSOLUTE: 0.2 /ΜL
EOSINOPHILS RELATIVE PERCENT: 3 %
GFR CALCULATED: 87
GLUCOSE BLD-MCNC: 128 MG/DL
HCT VFR BLD CALC: 41.7 % (ref 36–46)
HEMOGLOBIN: 13.7 G/DL (ref 12–16)
LYMPHOCYTES ABSOLUTE: 1.8 /ΜL
LYMPHOCYTES RELATIVE PERCENT: 24 %
MCH RBC QN AUTO: 31.6 PG
MCHC RBC AUTO-ENTMCNC: 32.9 G/DL
MCV RBC AUTO: 96 FL
MONOCYTES ABSOLUTE: 0.4 /ΜL
MONOCYTES RELATIVE PERCENT: 5 %
NEUTROPHILS ABSOLUTE: 5 /ΜL
NEUTROPHILS RELATIVE PERCENT: 67 %
PDW BLD-RTO: 12.3 %
PLATELET # BLD: 314 K/ΜL
PMV BLD AUTO: NORMAL FL
POTASSIUM SERPL-SCNC: 4.1 MMOL/L
RBC # BLD: 4.33 10^6/ΜL
SEDIMENTATION RATE, ERYTHROCYTE: 7
SODIUM BLD-SCNC: 141 MMOL/L
TOTAL PROTEIN: 6.8
WBC # BLD: 7.4 10^3/ML

## 2022-09-07 ENCOUNTER — HOSPITAL ENCOUNTER (OUTPATIENT)
Dept: NURSING | Age: 62
Discharge: HOME | End: 2022-09-07
Payer: COMMERCIAL

## 2022-09-07 VITALS
RESPIRATION RATE: 18 BRPM | TEMPERATURE: 98.5 F | DIASTOLIC BLOOD PRESSURE: 56 MMHG | OXYGEN SATURATION: 98 % | SYSTOLIC BLOOD PRESSURE: 105 MMHG | BODY MASS INDEX: 21.93 KG/M2 | WEIGHT: 116 LBS | HEART RATE: 95 BPM

## 2022-09-07 DIAGNOSIS — M05.79 RHEUMATOID ARTHRITIS INVOLVING MULTIPLE SITES WITH POSITIVE RHEUMATOID FACTOR (HCC): Primary | ICD-10-CM

## 2022-09-07 PROCEDURE — 96415 CHEMO IV INFUSION ADDL HR: CPT

## 2022-09-07 PROCEDURE — 2580000003 HC RX 258: Performed by: INTERNAL MEDICINE

## 2022-09-07 PROCEDURE — 6370000000 HC RX 637 (ALT 250 FOR IP): Performed by: INTERNAL MEDICINE

## 2022-09-07 PROCEDURE — 96413 CHEMO IV INFUSION 1 HR: CPT

## 2022-09-07 PROCEDURE — 2580000003 HC RX 258: Performed by: NURSE PRACTITIONER

## 2022-09-07 PROCEDURE — 6360000002 HC RX W HCPCS: Performed by: NURSE PRACTITIONER

## 2022-09-07 RX ORDER — CETIRIZINE HYDROCHLORIDE 10 MG/1
10 TABLET ORAL ONCE
Status: CANCELLED | OUTPATIENT
Start: 2022-09-21

## 2022-09-07 RX ORDER — SODIUM CHLORIDE 9 MG/ML
INJECTION, SOLUTION INTRAVENOUS ONCE
Status: CANCELLED | OUTPATIENT
Start: 2022-09-21 | End: 2022-09-21

## 2022-09-07 RX ORDER — ACETAMINOPHEN 325 MG/1
650 TABLET ORAL ONCE
Status: CANCELLED | OUTPATIENT
Start: 2022-09-21

## 2022-09-07 RX ORDER — DIPHENHYDRAMINE HCL 25 MG
25 TABLET ORAL ONCE
Status: CANCELLED | OUTPATIENT
Start: 2022-09-21

## 2022-09-07 RX ORDER — ACETAMINOPHEN 325 MG/1
650 TABLET ORAL ONCE
Status: COMPLETED | OUTPATIENT
Start: 2022-09-07 | End: 2022-09-07

## 2022-09-07 RX ORDER — DIPHENHYDRAMINE HYDROCHLORIDE 50 MG/ML
50 INJECTION INTRAMUSCULAR; INTRAVENOUS
Status: CANCELLED | OUTPATIENT
Start: 2022-09-21

## 2022-09-07 RX ORDER — SODIUM CHLORIDE 9 MG/ML
5-250 INJECTION, SOLUTION INTRAVENOUS PRN
Status: CANCELLED | OUTPATIENT
Start: 2022-09-21

## 2022-09-07 RX ORDER — SODIUM CHLORIDE 9 MG/ML
INJECTION, SOLUTION INTRAVENOUS ONCE
Status: COMPLETED | OUTPATIENT
Start: 2022-09-07 | End: 2022-09-07

## 2022-09-07 RX ORDER — ACETAMINOPHEN 325 MG/1
650 TABLET ORAL
Status: CANCELLED | OUTPATIENT
Start: 2022-09-21

## 2022-09-07 RX ORDER — ONDANSETRON 2 MG/ML
8 INJECTION INTRAMUSCULAR; INTRAVENOUS
Status: CANCELLED | OUTPATIENT
Start: 2022-09-21

## 2022-09-07 RX ORDER — HEPARIN SODIUM (PORCINE) LOCK FLUSH IV SOLN 100 UNIT/ML 100 UNIT/ML
500 SOLUTION INTRAVENOUS PRN
Status: CANCELLED | OUTPATIENT
Start: 2022-09-21

## 2022-09-07 RX ORDER — CETIRIZINE HYDROCHLORIDE 10 MG/1
10 TABLET ORAL ONCE
Status: COMPLETED | OUTPATIENT
Start: 2022-09-07 | End: 2022-09-07

## 2022-09-07 RX ORDER — ALBUTEROL SULFATE 90 UG/1
4 AEROSOL, METERED RESPIRATORY (INHALATION) PRN
Status: CANCELLED | OUTPATIENT
Start: 2022-09-21

## 2022-09-07 RX ORDER — SODIUM CHLORIDE 9 MG/ML
INJECTION, SOLUTION INTRAVENOUS CONTINUOUS
Status: CANCELLED | OUTPATIENT
Start: 2022-09-21

## 2022-09-07 RX ORDER — DIPHENHYDRAMINE HCL 25 MG
25 TABLET ORAL ONCE
Status: COMPLETED | OUTPATIENT
Start: 2022-09-07 | End: 2022-09-07

## 2022-09-07 RX ORDER — SODIUM CHLORIDE 0.9 % (FLUSH) 0.9 %
5-40 SYRINGE (ML) INJECTION PRN
Status: CANCELLED | OUTPATIENT
Start: 2022-09-21

## 2022-09-07 RX ADMIN — SODIUM CHLORIDE 200 MG: 9 INJECTION, SOLUTION INTRAVENOUS at 10:28

## 2022-09-07 RX ADMIN — ACETAMINOPHEN 650 MG: 325 TABLET ORAL at 09:35

## 2022-09-07 RX ADMIN — CETIRIZINE HYDROCHLORIDE 10 MG: 10 TABLET, FILM COATED ORAL at 09:35

## 2022-09-07 RX ADMIN — DIPHENHYDRAMINE HYDROCHLORIDE 25 MG: 25 TABLET ORAL at 09:35

## 2022-09-07 RX ADMIN — SODIUM CHLORIDE: 9 INJECTION, SOLUTION INTRAVENOUS at 09:34

## 2022-09-07 NOTE — DISCHARGE INSTRUCTIONS
DISCHARGE INSTRUCTION SHEET        YOU ARE ADVISED TO CARRY OUT THE FOLLOWING INSTRUCTIONS:    DIET:  As tolerated    ACTIVITY:  As tolerated    OTHER:  Call your doctor or return to the nearest Emergency Room if:     A.   Within 2 hrs:  Dizziness, trouble breathing, rash   B.  3 - 12 days after infusion:  Fever, rash, sore throat, swelling to hands/face, `        trouble breathing or swelling, joint pain, cough, or fatigue

## 2022-09-07 NOTE — PROGRESS NOTES
0900  Sapna ambulated into room for inflectra infusion. Pt rights and responsibilities offered to pt. Sapna's infusion explained and questions answered. She states no infections and no atbs today that she is aware of. She was offered drink snack and blanket. Call light within reach. Iv started. Pre meds given. Sapna tolerated well. She ambulated to restroom and did well. 1028  Inflectra infusion began and Evi Correa has no needs at this time. Call light within reach. [address] is tolerating infusion with no needs at this time. Call light within reach. [address] is doing well still with infusion and no needs at this time    1237  Infusion complete and Evi Correa tolerated well. 1310  D/c instructions explained and Evi Correa verbalized understanding. Evi Correa ambulated out for d/c and states she feels good. No other needs today.            _m___ Safety:       (Environmental)  [place] to environment  Ensure ID band is correct and in place/ allergy band as needed  Assess for fall risk  Initiate fall precautions as applicable (fall band, side rails, etc.)  Call light within reach  Bed in low position/ wheels locked    __m__ Pain:       Assess pain level and characteristics  Administer analgesics as ordered  Assess effectiveness of pain management and report to MD as needed    _m___ Knowledge Deficit:  Assess baseline knowledge  Provide teaching at level of understanding  Provide teaching via preferred learning method  Evaluate teaching effectiveness    _m___ Hemodynamic/Respiratory Status:       (Pre and Post Procedure Monitoring)  Assess/Monitor vital signs and LOC  Assess Baseline SpO2 prior to any sedation  Obtain weight/height  Assess vital signs/ LOC until patient meets discharge criteria  Monitor procedure site and notify MD of any issues

## 2022-09-14 DIAGNOSIS — M06.30 RHEUMATOID NODULOSIS (HCC): ICD-10-CM

## 2022-09-14 DIAGNOSIS — M05.79 RHEUMATOID ARTHRITIS INVOLVING MULTIPLE SITES WITH POSITIVE RHEUMATOID FACTOR (HCC): ICD-10-CM

## 2022-09-21 ENCOUNTER — HOSPITAL ENCOUNTER (OUTPATIENT)
Dept: NURSING | Age: 62
End: 2022-09-21

## 2022-09-26 ENCOUNTER — HOSPITAL ENCOUNTER (OUTPATIENT)
Dept: NURSING | Age: 62
Discharge: HOME | End: 2022-09-26
Payer: COMMERCIAL

## 2022-09-26 VITALS
BODY MASS INDEX: 21.59 KG/M2 | OXYGEN SATURATION: 97 % | RESPIRATION RATE: 14 BRPM | HEART RATE: 70 BPM | SYSTOLIC BLOOD PRESSURE: 119 MMHG | TEMPERATURE: 98.4 F | DIASTOLIC BLOOD PRESSURE: 65 MMHG | WEIGHT: 114.2 LBS

## 2022-09-26 DIAGNOSIS — M05.79 RHEUMATOID ARTHRITIS INVOLVING MULTIPLE SITES WITH POSITIVE RHEUMATOID FACTOR (HCC): Primary | ICD-10-CM

## 2022-09-26 PROCEDURE — 6370000000 HC RX 637 (ALT 250 FOR IP): Performed by: INTERNAL MEDICINE

## 2022-09-26 PROCEDURE — 6360000002 HC RX W HCPCS: Performed by: NURSE PRACTITIONER

## 2022-09-26 PROCEDURE — 2580000003 HC RX 258: Performed by: INTERNAL MEDICINE

## 2022-09-26 PROCEDURE — 96413 CHEMO IV INFUSION 1 HR: CPT

## 2022-09-26 PROCEDURE — 96415 CHEMO IV INFUSION ADDL HR: CPT

## 2022-09-26 PROCEDURE — 2580000003 HC RX 258: Performed by: NURSE PRACTITIONER

## 2022-09-26 RX ORDER — ACETAMINOPHEN 325 MG/1
650 TABLET ORAL ONCE
Status: CANCELLED | OUTPATIENT
Start: 2022-10-03

## 2022-09-26 RX ORDER — CETIRIZINE HYDROCHLORIDE 10 MG/1
10 TABLET ORAL ONCE
OUTPATIENT
Start: 2022-10-03

## 2022-09-26 RX ORDER — DIPHENHYDRAMINE HYDROCHLORIDE 50 MG/ML
50 INJECTION INTRAMUSCULAR; INTRAVENOUS
OUTPATIENT
Start: 2022-10-03

## 2022-09-26 RX ORDER — CETIRIZINE HYDROCHLORIDE 10 MG/1
10 TABLET ORAL ONCE
Status: CANCELLED | OUTPATIENT
Start: 2022-10-03

## 2022-09-26 RX ORDER — SODIUM CHLORIDE 9 MG/ML
5-250 INJECTION, SOLUTION INTRAVENOUS PRN
OUTPATIENT
Start: 2022-10-03

## 2022-09-26 RX ORDER — SODIUM CHLORIDE 9 MG/ML
INJECTION, SOLUTION INTRAVENOUS CONTINUOUS
OUTPATIENT
Start: 2022-10-03

## 2022-09-26 RX ORDER — DIPHENHYDRAMINE HCL 25 MG
25 TABLET ORAL ONCE
OUTPATIENT
Start: 2022-10-03

## 2022-09-26 RX ORDER — ONDANSETRON 2 MG/ML
8 INJECTION INTRAMUSCULAR; INTRAVENOUS
OUTPATIENT
Start: 2022-10-03

## 2022-09-26 RX ORDER — HEPARIN SODIUM (PORCINE) LOCK FLUSH IV SOLN 100 UNIT/ML 100 UNIT/ML
500 SOLUTION INTRAVENOUS PRN
OUTPATIENT
Start: 2022-10-03

## 2022-09-26 RX ORDER — SODIUM CHLORIDE 9 MG/ML
INJECTION, SOLUTION INTRAVENOUS ONCE
Status: CANCELLED | OUTPATIENT
Start: 2022-10-03 | End: 2022-10-03

## 2022-09-26 RX ORDER — ACETAMINOPHEN 325 MG/1
650 TABLET ORAL
OUTPATIENT
Start: 2022-10-03

## 2022-09-26 RX ORDER — ACETAMINOPHEN 325 MG/1
650 TABLET ORAL ONCE
Status: COMPLETED | OUTPATIENT
Start: 2022-09-26 | End: 2022-09-26

## 2022-09-26 RX ORDER — ACETAMINOPHEN 325 MG/1
650 TABLET ORAL ONCE
OUTPATIENT
Start: 2022-10-03

## 2022-09-26 RX ORDER — CETIRIZINE HYDROCHLORIDE 10 MG/1
10 TABLET ORAL ONCE
Status: COMPLETED | OUTPATIENT
Start: 2022-09-26 | End: 2022-09-26

## 2022-09-26 RX ORDER — DIPHENHYDRAMINE HCL 25 MG
25 TABLET ORAL ONCE
Status: COMPLETED | OUTPATIENT
Start: 2022-09-26 | End: 2022-09-26

## 2022-09-26 RX ORDER — SODIUM CHLORIDE 9 MG/ML
INJECTION, SOLUTION INTRAVENOUS ONCE
OUTPATIENT
Start: 2022-10-03 | End: 2022-10-03

## 2022-09-26 RX ORDER — DIPHENHYDRAMINE HCL 25 MG
25 TABLET ORAL ONCE
Status: CANCELLED | OUTPATIENT
Start: 2022-10-03

## 2022-09-26 RX ORDER — SODIUM CHLORIDE 0.9 % (FLUSH) 0.9 %
5-40 SYRINGE (ML) INJECTION PRN
Status: CANCELLED | OUTPATIENT
Start: 2022-10-03

## 2022-09-26 RX ORDER — SODIUM CHLORIDE 9 MG/ML
INJECTION, SOLUTION INTRAVENOUS ONCE
Status: COMPLETED | OUTPATIENT
Start: 2022-09-26 | End: 2022-09-26

## 2022-09-26 RX ORDER — ALBUTEROL SULFATE 90 UG/1
4 AEROSOL, METERED RESPIRATORY (INHALATION) PRN
OUTPATIENT
Start: 2022-10-03

## 2022-09-26 RX ADMIN — CETIRIZINE HYDROCHLORIDE 10 MG: 10 TABLET, FILM COATED ORAL at 09:51

## 2022-09-26 RX ADMIN — SODIUM CHLORIDE: 9 INJECTION, SOLUTION INTRAVENOUS at 10:33

## 2022-09-26 RX ADMIN — DIPHENHYDRAMINE HYDROCHLORIDE 25 MG: 25 TABLET ORAL at 09:51

## 2022-09-26 RX ADMIN — ACETAMINOPHEN 650 MG: 325 TABLET ORAL at 09:50

## 2022-09-26 RX ADMIN — SODIUM CHLORIDE 200 MG: 9 INJECTION, SOLUTION INTRAVENOUS at 10:31

## 2022-09-26 ASSESSMENT — PAIN SCALES - GENERAL: PAINLEVEL_OUTOF10: 0

## 2022-09-26 NOTE — DISCHARGE INSTRUCTIONS
INFLECTRA DISCHARGE INSTRUCTION SHEET        YOU ARE ADVISED TO CARRY OUT THE FOLLOWING INSTRUCTIONS:    DIET:  As tolerated    ACTIVITY:  As tolerated    OTHER:  Call your doctor or return to the nearest Emergency Room if:     A.   Within 2 hrs:  Dizziness, trouble breathing, rash   B.  3 - 12 days after infusion:  Fever, rash, sore throat, swelling to hands/face, trouble breathing or swelling, joint pain, cough, or fatigue        Next INFLECTRA infusion is scheduled on:  10/10/22    at: 0930

## 2022-09-26 NOTE — PROGRESS NOTES
0930 Patient ambulatory to [facility] for Infectra infusion. Patient denies any recent infections or antibiotics. Patient verbalizes understanding of infusion. PT RIGHTS AND RESPONSIBILITIES OFFERED TO PT.     [id number] Pre meds given to patient. 1031 Inflectra infusion started. 1045 Infusion increased. Patient tolerating. 1100 Infusion increased. Patient denies any complaints. 1115 Infusion increased. Patient denies any complaints. 1130 Infusion increased. Patient denies any complaints. 1200 Infusion at max rate. Patient tolerating well. Denies any complaints. 1243 Infusion complete. Patient tolerated well. Denies any complaints. AVS reviewed with patient. Verbalizes understanding. Patient left ambulatory to discharge lobby.          _M___ Safety:       (Environmental)  [place] to environment  Ensure ID band is correct and in place/ allergy band as needed  Assess for fall risk  Initiate fall precautions as applicable (fall band, side rails, etc.)  Call light within reach  Bed in low position/ wheels locked    _M___ Pain:       Assess pain level and characteristics  Administer analgesics as ordered  Assess effectiveness of pain management and report to MD as needed    _M___ Knowledge Deficit:  Assess baseline knowledge  Provide teaching at level of understanding  Provide teaching via preferred learning method  Evaluate teaching effectiveness    _M___ Hemodynamic/Respiratory Status:       (Pre and Post Procedure Monitoring)  Assess/Monitor vital signs and LOC  Assess Baseline SpO2 prior to any sedation  Obtain weight/height  Assess vital signs/ LOC until patient meets discharge criteria  Monitor procedure site and notify MD of any issues

## 2022-09-29 LAB
ALBUMIN SERPL-MCNC: 3.8 G/DL
ALP BLD-CCNC: 76 U/L
ALT SERPL-CCNC: 13 U/L
ANION GAP SERPL CALCULATED.3IONS-SCNC: NORMAL MMOL/L
AST SERPL-CCNC: 17 U/L
BASOPHILS ABSOLUTE: 0 /ΜL
BASOPHILS RELATIVE PERCENT: 0 %
BILIRUB SERPL-MCNC: 0.5 MG/DL (ref 0.1–1.4)
BUN BLDV-MCNC: 15 MG/DL
C-REACTIVE PROTEIN: 4
CALCIUM SERPL-MCNC: 8.7 MG/DL
CHLORIDE BLD-SCNC: 101 MMOL/L
CO2: 18 MMOL/L
CREAT SERPL-MCNC: 0.65 MG/DL
EOSINOPHILS ABSOLUTE: 0.3 /ΜL
EOSINOPHILS RELATIVE PERCENT: 4 %
GFR CALCULATED: 98
GLUCOSE BLD-MCNC: 79 MG/DL
HCT VFR BLD CALC: 38.1 % (ref 36–46)
HEMOGLOBIN: 12.9 G/DL (ref 12–16)
LYMPHOCYTES ABSOLUTE: 2.8 /ΜL
LYMPHOCYTES RELATIVE PERCENT: 31 %
MCH RBC QN AUTO: 33.5 PG
MCHC RBC AUTO-ENTMCNC: 33.9 G/DL
MCV RBC AUTO: 99 FL
MONOCYTES ABSOLUTE: 0.7 /ΜL
MONOCYTES RELATIVE PERCENT: 8 %
NEUTROPHILS ABSOLUTE: 5 /ΜL
NEUTROPHILS RELATIVE PERCENT: 56 %
PDW BLD-RTO: 13.1 %
PLATELET # BLD: 315 K/ΜL
PMV BLD AUTO: NORMAL FL
POTASSIUM SERPL-SCNC: 4.6 MMOL/L
RBC # BLD: 3.85 10^6/ΜL
SEDIMENTATION RATE, ERYTHROCYTE: 42
SODIUM BLD-SCNC: 138 MMOL/L
TOTAL PROTEIN: 6
WBC # BLD: 8.8 10^3/ML

## 2022-10-03 DIAGNOSIS — M05.79 RHEUMATOID ARTHRITIS INVOLVING MULTIPLE SITES WITH POSITIVE RHEUMATOID FACTOR (HCC): ICD-10-CM

## 2022-10-03 DIAGNOSIS — M06.30 RHEUMATOID NODULOSIS (HCC): ICD-10-CM

## 2022-10-03 NOTE — TELEPHONE ENCOUNTER
DOLV: 6/30/22  DONV: 11/2/22  LAST LAB DRAW: 9/29/22  LAST TB TEST: 7/5/22    Lab Results   Component Value Date     09/29/2022    K 4.6 09/29/2022     09/29/2022    CO2 18 09/29/2022    BUN 15 09/29/2022    CREATININE 0.65 09/29/2022    GLUCOSE 79 09/29/2022    CALCIUM 8.7 09/29/2022    PROT 6.9 05/10/2021    LABALBU 3.8 09/29/2022    BILITOT 0.5 09/29/2022    ALKPHOS 76 09/29/2022    AST 17 09/29/2022    ALT 13 09/29/2022    LABGLOM 98 09/29/2022       Recent Labs     09/29/22  0000   WBC 8.8   HGB 12.9   HCT 38.1   MCV 99          Lab Results   Component Value Date    SEDRATE 42 09/29/2022       Lab Results   Component Value Date    CRP 4 09/29/2022

## 2022-10-10 ENCOUNTER — HOSPITAL ENCOUNTER (OUTPATIENT)
Dept: NURSING | Age: 62
Discharge: HOME | End: 2022-10-10
Payer: COMMERCIAL

## 2022-10-10 VITALS
RESPIRATION RATE: 16 BRPM | HEART RATE: 64 BPM | SYSTOLIC BLOOD PRESSURE: 99 MMHG | OXYGEN SATURATION: 95 % | WEIGHT: 118 LBS | DIASTOLIC BLOOD PRESSURE: 55 MMHG | BODY MASS INDEX: 22.31 KG/M2 | TEMPERATURE: 98.4 F

## 2022-10-10 DIAGNOSIS — M05.79 RHEUMATOID ARTHRITIS INVOLVING MULTIPLE SITES WITH POSITIVE RHEUMATOID FACTOR (HCC): Primary | ICD-10-CM

## 2022-10-10 PROCEDURE — 96415 CHEMO IV INFUSION ADDL HR: CPT

## 2022-10-10 PROCEDURE — 6370000000 HC RX 637 (ALT 250 FOR IP): Performed by: INTERNAL MEDICINE

## 2022-10-10 PROCEDURE — 2580000003 HC RX 258: Performed by: NURSE PRACTITIONER

## 2022-10-10 PROCEDURE — 2580000003 HC RX 258: Performed by: INTERNAL MEDICINE

## 2022-10-10 PROCEDURE — 96413 CHEMO IV INFUSION 1 HR: CPT

## 2022-10-10 PROCEDURE — 6360000002 HC RX W HCPCS: Performed by: NURSE PRACTITIONER

## 2022-10-10 RX ORDER — SODIUM CHLORIDE 0.9 % (FLUSH) 0.9 %
5-40 SYRINGE (ML) INJECTION PRN
Status: DISCONTINUED | OUTPATIENT
Start: 2022-10-10 | End: 2022-10-11 | Stop reason: HOSPADM

## 2022-10-10 RX ORDER — SODIUM CHLORIDE 9 MG/ML
5-250 INJECTION, SOLUTION INTRAVENOUS PRN
OUTPATIENT
Start: 2022-10-19

## 2022-10-10 RX ORDER — DIPHENHYDRAMINE HCL 25 MG
25 TABLET ORAL ONCE
OUTPATIENT
Start: 2022-10-19

## 2022-10-10 RX ORDER — CETIRIZINE HYDROCHLORIDE 10 MG/1
10 TABLET ORAL ONCE
Status: COMPLETED | OUTPATIENT
Start: 2022-10-10 | End: 2022-10-10

## 2022-10-10 RX ORDER — SODIUM CHLORIDE 9 MG/ML
INJECTION, SOLUTION INTRAVENOUS ONCE
OUTPATIENT
Start: 2022-10-19 | End: 2022-10-19

## 2022-10-10 RX ORDER — DIPHENHYDRAMINE HYDROCHLORIDE 50 MG/ML
50 INJECTION INTRAMUSCULAR; INTRAVENOUS
OUTPATIENT
Start: 2022-10-19

## 2022-10-10 RX ORDER — ACETAMINOPHEN 325 MG/1
650 TABLET ORAL ONCE
Status: CANCELLED | OUTPATIENT
Start: 2022-10-19

## 2022-10-10 RX ORDER — ALBUTEROL SULFATE 90 UG/1
4 AEROSOL, METERED RESPIRATORY (INHALATION) PRN
OUTPATIENT
Start: 2022-10-19

## 2022-10-10 RX ORDER — ACETAMINOPHEN 325 MG/1
650 TABLET ORAL
OUTPATIENT
Start: 2022-10-19

## 2022-10-10 RX ORDER — CETIRIZINE HYDROCHLORIDE 10 MG/1
10 TABLET ORAL ONCE
Status: CANCELLED | OUTPATIENT
Start: 2022-10-19

## 2022-10-10 RX ORDER — SODIUM CHLORIDE 9 MG/ML
INJECTION, SOLUTION INTRAVENOUS ONCE
Status: COMPLETED | OUTPATIENT
Start: 2022-10-10 | End: 2022-10-10

## 2022-10-10 RX ORDER — ONDANSETRON 2 MG/ML
8 INJECTION INTRAMUSCULAR; INTRAVENOUS
OUTPATIENT
Start: 2022-10-19

## 2022-10-10 RX ORDER — SODIUM CHLORIDE 9 MG/ML
INJECTION, SOLUTION INTRAVENOUS ONCE
Status: CANCELLED | OUTPATIENT
Start: 2022-10-19 | End: 2022-10-19

## 2022-10-10 RX ORDER — DIPHENHYDRAMINE HCL 25 MG
25 TABLET ORAL ONCE
Status: CANCELLED | OUTPATIENT
Start: 2022-10-19

## 2022-10-10 RX ORDER — SODIUM CHLORIDE 0.9 % (FLUSH) 0.9 %
5-40 SYRINGE (ML) INJECTION PRN
OUTPATIENT
Start: 2022-10-19

## 2022-10-10 RX ORDER — DIPHENHYDRAMINE HCL 25 MG
25 TABLET ORAL ONCE
Status: COMPLETED | OUTPATIENT
Start: 2022-10-10 | End: 2022-10-10

## 2022-10-10 RX ORDER — ACETAMINOPHEN 325 MG/1
650 TABLET ORAL ONCE
Status: COMPLETED | OUTPATIENT
Start: 2022-10-10 | End: 2022-10-10

## 2022-10-10 RX ORDER — SODIUM CHLORIDE 9 MG/ML
INJECTION, SOLUTION INTRAVENOUS CONTINUOUS
OUTPATIENT
Start: 2022-10-19

## 2022-10-10 RX ORDER — ACETAMINOPHEN 325 MG/1
650 TABLET ORAL ONCE
OUTPATIENT
Start: 2022-10-19

## 2022-10-10 RX ORDER — HEPARIN SODIUM (PORCINE) LOCK FLUSH IV SOLN 100 UNIT/ML 100 UNIT/ML
500 SOLUTION INTRAVENOUS PRN
OUTPATIENT
Start: 2022-10-19

## 2022-10-10 RX ORDER — CETIRIZINE HYDROCHLORIDE 10 MG/1
10 TABLET ORAL ONCE
OUTPATIENT
Start: 2022-10-19

## 2022-10-10 RX ADMIN — DIPHENHYDRAMINE HYDROCHLORIDE 25 MG: 25 TABLET ORAL at 09:55

## 2022-10-10 RX ADMIN — SODIUM CHLORIDE: 9 INJECTION, SOLUTION INTRAVENOUS at 09:51

## 2022-10-10 RX ADMIN — CETIRIZINE HYDROCHLORIDE 10 MG: 10 TABLET, FILM COATED ORAL at 09:55

## 2022-10-10 RX ADMIN — ACETAMINOPHEN 650 MG: 325 TABLET ORAL at 09:55

## 2022-10-10 RX ADMIN — SODIUM CHLORIDE 200 MG: 9 INJECTION, SOLUTION INTRAVENOUS at 10:37

## 2022-10-10 ASSESSMENT — PAIN SCALES - GENERAL: PAINLEVEL_OUTOF10: 0

## 2022-10-10 ASSESSMENT — PAIN - FUNCTIONAL ASSESSMENT: PAIN_FUNCTIONAL_ASSESSMENT: NONE - DENIES PAIN

## 2022-10-10 NOTE — PROGRESS NOTES
0940 PT ADMITTED TO [facility] PER AMBULATION FOR A INFLECTRA INFUSION. PT DENIES BEING SICK AND IS NOT ON ANY ANTIBIOTICS PT RIGHTS AND RESPONSIBILITIES OFFERED TO PT.   1030 resting quietly at present. 1038 infusion in progress  1050 no concerns  1105 resting quietly. 1120 yary infusion well. No concerns. 1135 infusion continues. 1205 pt resting quielty at present. 1255 infusion completed pt yary well. Discharge instruction given to patient. Verbalize understanding of home. Going instructions. 1300 discharge per ambulation to home. Stable.                            __M__ Safety:       (Environmental)  [place] to environment  Ensure ID band is correct and in place/ allergy band as needed  Assess for fall risk  Initiate fall precautions as applicable (fall band, side rails, etc.)  Call light within reach  Bed in low position/ wheels locked    __M__ Pain:       Assess pain level and characteristics  Administer analgesics as ordered  Assess effectiveness of pain management and report to MD as needed    ___M_ Knowledge Deficit:  Assess baseline knowledge  Provide teaching at level of understanding  Provide teaching via preferred learning method  Evaluate teaching effectiveness    __M__ Hemodynamic/Respiratory Status:       (Pre and Post Procedure Monitoring)  Assess/Monitor vital signs and LOC  Assess Baseline SpO2 prior to any sedation  Obtain weight/height  Assess vital signs/ LOC until patient meets discharge criteria  Monitor procedure site and notify MD of any issues    ___M_ Infection-Risk of Central Venous Catheter:  Monitor for infection signs and symptoms (catheter site redness, temperature elevation, etc)  Assess for infection risks  Educate regarding infection prevention  Manage central venous catheter (flushes/ dressing changes per protocol)

## 2022-10-10 NOTE — DISCHARGE INSTRUCTIONS
INFLECTRA DISCHARGE INSTRUCTION SHEET        YOU ARE ADVISED TO CARRY OUT THE FOLLOWING INSTRUCTIONS:    DIET:  As tolerated    ACTIVITY:  As tolerated    OTHER:  Call your doctor or return to the nearest Emergency Room if:     A.   Within 2 hrs:  Dizziness, trouble breathing, rash   B.  3 - 12 days after infusion:  Fever, rash, sore throat, swelling to hands/face, `        trouble breathing or swelling, joint pain, cough, or fatigue        Next INFLECTRA  infusion is scheduled on:  December 12th, Monday    at:  0930

## 2022-10-27 LAB
ALBUMIN SERPL-MCNC: 4.2 G/DL
ALP BLD-CCNC: 94 U/L
ALT SERPL-CCNC: 14 U/L
ANION GAP SERPL CALCULATED.3IONS-SCNC: NORMAL MMOL/L
AST SERPL-CCNC: 16 U/L
BASOPHILS ABSOLUTE: 0.1 /ΜL
BASOPHILS RELATIVE PERCENT: 2 %
BILIRUB SERPL-MCNC: 0.7 MG/DL (ref 0.1–1.4)
BUN BLDV-MCNC: 15 MG/DL
C-REACTIVE PROTEIN: 1
CALCIUM SERPL-MCNC: 9.5 MG/DL
CHLORIDE BLD-SCNC: 103 MMOL/L
CO2: 24 MMOL/L
CREAT SERPL-MCNC: 0.84 MG/DL
EOSINOPHILS ABSOLUTE: 0.3 /ΜL
EOSINOPHILS RELATIVE PERCENT: 4 %
GFR CALCULATED: 78
GLUCOSE BLD-MCNC: 93 MG/DL
HCT VFR BLD CALC: 40.4 % (ref 36–46)
HEMOGLOBIN: 13.3 G/DL (ref 12–16)
LYMPHOCYTES ABSOLUTE: 1.5 /ΜL
LYMPHOCYTES RELATIVE PERCENT: 26 %
MCH RBC QN AUTO: 31.4 PG
MCHC RBC AUTO-ENTMCNC: 32.9 G/DL
MCV RBC AUTO: 96 FL
MONOCYTES ABSOLUTE: 0.6 /ΜL
MONOCYTES RELATIVE PERCENT: 9 %
NEUTROPHILS ABSOLUTE: 3.5 /ΜL
NEUTROPHILS RELATIVE PERCENT: 59 %
PDW BLD-RTO: 12.7 %
PLATELET # BLD: 356 K/ΜL
PMV BLD AUTO: NORMAL FL
POTASSIUM SERPL-SCNC: 4 MMOL/L
RBC # BLD: 4.23 10^6/ΜL
SEDIMENTATION RATE, ERYTHROCYTE: 31
SODIUM BLD-SCNC: 140 MMOL/L
TOTAL PROTEIN: 6.2
WBC # BLD: 6 10^3/ML

## 2022-10-31 DIAGNOSIS — M05.79 RHEUMATOID ARTHRITIS INVOLVING MULTIPLE SITES WITH POSITIVE RHEUMATOID FACTOR (HCC): ICD-10-CM

## 2022-10-31 DIAGNOSIS — M06.30 RHEUMATOID NODULOSIS (HCC): ICD-10-CM

## 2022-11-02 ENCOUNTER — OFFICE VISIT (OUTPATIENT)
Dept: RHEUMATOLOGY | Age: 62
End: 2022-11-02
Payer: COMMERCIAL

## 2022-11-02 VITALS
DIASTOLIC BLOOD PRESSURE: 66 MMHG | WEIGHT: 117.6 LBS | HEART RATE: 89 BPM | RESPIRATION RATE: 97 BRPM | SYSTOLIC BLOOD PRESSURE: 96 MMHG | BODY MASS INDEX: 22.2 KG/M2 | HEIGHT: 61 IN

## 2022-11-02 DIAGNOSIS — Z51.81 MEDICATION MONITORING ENCOUNTER: ICD-10-CM

## 2022-11-02 DIAGNOSIS — M17.0 OSTEOARTHRITIS OF BOTH KNEES, UNSPECIFIED OSTEOARTHRITIS TYPE: ICD-10-CM

## 2022-11-02 DIAGNOSIS — M06.30 RHEUMATOID NODULOSIS (HCC): ICD-10-CM

## 2022-11-02 DIAGNOSIS — Z78.0 POSTMENOPAUSAL: ICD-10-CM

## 2022-11-02 DIAGNOSIS — M05.79 RHEUMATOID ARTHRITIS INVOLVING MULTIPLE SITES WITH POSITIVE RHEUMATOID FACTOR (HCC): Primary | ICD-10-CM

## 2022-11-02 PROCEDURE — 99214 OFFICE O/P EST MOD 30 MIN: CPT | Performed by: NURSE PRACTITIONER

## 2022-11-02 ASSESSMENT — ENCOUNTER SYMPTOMS
CONSTIPATION: 0
BACK PAIN: 0
NAUSEA: 0
EYE PAIN: 0
TROUBLE SWALLOWING: 0
SHORTNESS OF BREATH: 0
COUGH: 0
EYE ITCHING: 0
DIARRHEA: 0
ABDOMINAL PAIN: 0

## 2022-11-02 NOTE — PROGRESS NOTES
[facility] RHEUMATOLOGY FOLLOW UP NOTE       Date Of Service: 11/2/2022  Provider: BANDAR Treviño - TRINIDAD    Name: Yelena Norman   MRN: [id number]    Chief Complaint(s)     Chief Complaint   Patient presents with    Follow-up     4 month f/u        History of Present Illness (HPI)     Yelena Norman  is a(n)62 y.o. female with a hx of osteoarthritis bilateral knees, rheumatoid arthritis, left hand knuckle replacement, left elbow nodule removal (2010), nodule removals bilateral feet (2005) here for the f/u evaluation of rheumatoid arthritis. Interval hx:    - started infliximab infusion- completed loading dose and feels improvement    Denies any joint pains, swelling or morning stiffness. REVIEW OF SYSTEMS: (ROS)    Review of Systems   Constitutional:  Negative for fatigue, fever and unexpected weight change. HENT:  Negative for congestion and trouble swallowing. Eyes:  Negative for pain and itching. Respiratory:  Negative for cough and shortness of breath. Cardiovascular:  Negative for chest pain and leg swelling. Gastrointestinal:  Negative for abdominal pain, constipation, diarrhea and nausea. Endocrine: Negative for cold intolerance and heat intolerance. Genitourinary:  Negative for difficulty urinating, frequency and urgency. Musculoskeletal:  Negative for arthralgias, back pain and joint swelling. Skin:  Negative for rash. Neurological:  Negative for dizziness, weakness, numbness and headaches. Psychiatric/Behavioral:  The patient is not nervous/anxious. PAST MEDICAL HISTORY      Past Medical History:   Diagnosis Date    High cholesterol     Hypertension     RA (rheumatoid arthritis) ([facility] [address]        PAST SURGICAL HISTORY      Past Surgical History:   Procedure Laterality Date    REVISION TOTAL KNEE ARTHROPLASTY Right        FAMILY HISTORY    History reviewed. No pertinent family history.     SOCIAL HISTORY      Social History       Tobacco History       Smoking Status  Never Smoker      Smokeless Tobacco Use  Never Used              Alcohol History       Alcohol Use Status  Never              Drug Use       Drug Use Status  Never              Sexual Activity       Sexually Active  Not Asked                    ALLERGIES   No Known Allergies    CURRENT MEDICATIONS      Current Outpatient Medications   Medication Sig Dispense Refill    methotrexate (RHEUMATREX) 2.5 MG chemo tablet Take 4 tablets by mouth once a week 16 tablet 1    folic acid (FOLVITE) 1 MG tablet Take 1 tablet by mouth daily 60 tablet 2    meloxicam (MOBIC) 15 MG tablet TAKE 1 TABLET BY MOUTH EVERY DAY prn pain 90 tablet 1    lisinopril (PRINIVIL;ZESTRIL) 20 MG tablet TAKE 1 TABLET BY MOUTH EVERY DAY AS DIRECTED      atorvastatin (LIPITOR) 20 MG tablet TAKE 1 TABLET BY MOUTH ONCE DAILY       No current facility-administered medications for this visit. PHYSICAL EXAMINATION / OBJECTIVE   Objective:  BP 96/66 (Site: Left Upper Arm, Position: Sitting, Cuff Size: Medium Adult)   Pulse 89   Resp (!) 97   Ht 5' 0.98\" (1.549 m)   Wt 117 lb 9.6 oz (53.3 kg)   BMI 22.23 kg/m²     Physical Exam  Vitals reviewed. Constitutional:       Appearance: She is well-developed. Cardiovascular:      Rate and Rhythm: Normal rate and regular rhythm. Pulmonary:      Effort: Pulmonary effort is normal.      Breath sounds: Normal breath sounds. Musculoskeletal:      Cervical back: Normal range of motion and neck supple. Skin:     General: Skin is warm and dry. Findings: No rash. Comments: nodulsosis bilat elbows, right thumb, right 3rd distal finger , left lateral ankles, plantar aspect of MTPs   Neurological:      Mental Status: She is alert and oriented to person, place, and time. Psychiatric:         Thought Content:  Thought content normal.     Upper extremities:    Nontender, + bogginess bilat 2,3 MCPs    Lower extremities:  Nontender, no swelling         LABS    CBC  Lab Results   Component Value Date/Time    WBC 6.0 10/27/2022 12:00 AM    RBC 4.23 10/27/2022 12:00 AM    HGB 13.3 10/27/2022 12:00 AM    HCT 40.4 10/27/2022 12:00 AM    MCV 96 10/27/2022 12:00 AM    MCH 31.4 10/27/2022 12:00 AM    MCHC 32.9 10/27/2022 12:00 AM    RDW 12.7 10/27/2022 12:00 AM     10/27/2022 12:00 AM       CMP  Lab Results   Component Value Date/Time    CALCIUM 9.5 10/27/2022 12:00 AM    LABALBU 4.2 10/27/2022 12:00 AM    PROT 6.9 05/10/2021 03:53 PM     10/27/2022 12:00 AM    K 4.0 10/27/2022 12:00 AM    CO2 24 10/27/2022 12:00 AM     10/27/2022 12:00 AM    BUN 15 10/27/2022 12:00 AM    CREATININE 0.84 10/27/2022 12:00 AM    ALKPHOS 94 10/27/2022 12:00 AM    ALT 14 10/27/2022 12:00 AM    AST 16 10/27/2022 12:00 AM       HgBA1c: No components found for: HGBA1C    No results found for: VITD25      No results found for: ANASCRN  No results found for: SSA  No results found for: SSB  No results found for: ANTI-SMITH  No results found for: DSDNAAB   No results found for: ANTIRNP  No results found for: C3, C4  No results found for: CCPAB  Lab Results   Component Value Date     (H) 05/10/2021       No components found for: CANCASCRN, APANCASCRN  Lab Results   Component Value Date    SEDRATE 31 10/27/2022     Lab Results   Component Value Date    CRP 1 10/27/2022       RADIOLOGY:         ASSESSMENT/PLAN    Assessment   Plan     Seropositive rheumatoid arthritis +RF, + CCP  Rheumatoid nodulosis ([facility] [address]- elbow, hands, left ankle, left foot  - diagnosed in 1995 with bilateral hand pain and swelling. Dr. Scott Freedman started methotrexate + enbrel (2005, sig relief). Methotrexate stopped in 2018 due to ? Remission. + nodulosis bilat elbows with prior removal as well as removals on feet. Exam with no tenderness, + swelling/warmth left wrist, active nodulosis elbows and left ankle.   - prior tx: enbrel 25 mg BID (burning with injection)               - methotrexate 10 mg PO weekly & folic acid 1 mg daily   - infliximab 3 mg/kg q 8 weeks (9/7/2022)     Osteoarthritis of both knees, unspecified osteoarthritis type  - prior tx: cortisone injection (relief), synvisc (relief), s/p replacement 9/2021     Postmenopausal   - DEXA ordered, needs completed    Medication monitoring    - CBC, CMP, sed rate, CRP q 8 weeks   - TB gold negative (11/2021)      No follow-ups on file. Electronically signed by BANDAR Avilez CNP on 11/2/2022 at 10:25 AM    New Prescriptions    No medications on file       Thank you for allowing me to participate in the care of this patient. Please call if there are any questions.

## 2022-11-02 NOTE — TELEPHONE ENCOUNTER
Raj Garcia! It looks like the induction was approved as buy and bill. Site of care info is located in the approval letter from 8/1. Thanks!     DELICIA Dudley  Senior Insurance Authorization Specialist  [facility])

## 2022-11-07 NOTE — TELEPHONE ENCOUNTER
Can you please work on getting her set up for home infusions for her next maintenance dose and please have that cancelled at [facility] since it is not approved for that location. Patient will need notified as well.

## 2022-11-08 ENCOUNTER — TELEPHONE (OUTPATIENT)
Dept: RHEUMATOLOGY | Age: 62
End: 2022-11-08

## 2022-11-08 NOTE — TELEPHONE ENCOUNTER
Phoned Sheeba Wilson and informed of site of care denial and need to refer to [facility] [place] [facility]". She is aware they should be calling her to schedule and I am canceling her date in December at outpatient nursing at [facility] order has been faxed and scanned.  See attached

## 2022-11-14 NOTE — TELEPHONE ENCOUNTER
Order changed from [address] to [address]. Order signed and faxed.      See copy scanned

## 2022-11-16 ENCOUNTER — HOSPITAL ENCOUNTER (OUTPATIENT)
Dept: WOMENS IMAGING | Age: 62
Discharge: HOME | End: 2022-11-16
Payer: COMMERCIAL

## 2022-11-16 ENCOUNTER — TELEPHONE (OUTPATIENT)
Dept: RHEUMATOLOGY | Age: 62
End: 2022-11-16

## 2022-11-16 DIAGNOSIS — M05.79 RHEUMATOID ARTHRITIS INVOLVING MULTIPLE SITES WITH POSITIVE RHEUMATOID FACTOR (HCC): Primary | ICD-10-CM

## 2022-11-16 DIAGNOSIS — Z78.0 POSTMENOPAUSAL: ICD-10-CM

## 2022-11-16 DIAGNOSIS — M81.0 OSTEOPOROSIS WITHOUT CURRENT PATHOLOGICAL FRACTURE, UNSPECIFIED OSTEOPOROSIS TYPE: ICD-10-CM

## 2022-11-16 PROCEDURE — 77080 DXA BONE DENSITY AXIAL: CPT

## 2022-11-16 NOTE — TELEPHONE ENCOUNTER
Diagnosis Orders   1. Rheumatoid arthritis involving multiple sites with positive rheumatoid factor ([facility] [address]        2. Postmenopausal        3. Osteoporosis without current pathological fracture, unspecified osteoporosis type  Magnesium    NTX-Telopeptide,Urine    Phosphorus    TSH with Reflex    Vitamin D 25 Hydroxy    Creatinine, 24 HR Urine    Calcium, 24 HR Urine    PTH, Intact        - Will evaluate for secondary OP with the evaluation of the patients   TSH, PTH, Phosphorus, Magnesium, Vitamin D 25- hydroxy and 24 hour urine for Calcium, Sodium and Creatinine. -- Start -- Alendronate 70mg once weekly. 11/16/2022 - if evaluation for secondary causes of osteoporosis are negative. Patient was willing to start the medication No prior hx of fractures, barrets esophagus, esophageal strictures. .   - we have discussed significant benefit of fracture prevention. --- potential risks for  acute phase reaction (flu like symptoms), worsening renal function, reflux/heart burn, osteonecrosis of the jaw, atypical femoral fracture and hypocalcemia were discussed with the patient. ----The patient was advised to take the medication in the morning on an empty stomach with a 8oz full glass of water. The patient should remain upright and take nothing by mouth at least 30 minutes after taking the medication. Patient called and the bone density resutls wree discussed.

## 2022-11-16 NOTE — RESULT ENCOUNTER NOTE
The bone density study is consistent with osteoporosis with a T score of -3.2 from the left hip and -3.3 in the lower back (lumbar spine). Anything more negative than -2.5 is consistent with the diagnosis of osteoporosis. At this time I would like to start alendronate 70 mg once weekly to try to prevent any further worsening of the bone thinning. The medication can be associated with potential risks : including but not limited to acute phase reaction (flu like symptoms), worsening renal function (mainly in those with already poor kidney functions) , reflux/heart burn, osteonecrosis of the jaw (very low risk) , atypical femoral fracture and hypocalcemia (low calcium level) . You would need to take the medication in the morning on an empty stomach with a 8oz full glass of water. After taking the medication you should remain upright and take nothing by mouth at least 30 minutes after taking the medication. Please let the office know if you would be willing to take the medication of if you would like to discuss prior to making a decision.

## 2022-11-17 ENCOUNTER — TELEPHONE (OUTPATIENT)
Dept: RHEUMATOLOGY | Age: 62
End: 2022-11-17

## 2022-11-28 LAB
CALCIUM 24 HOUR URINE: 260
CREATINE 24 HOUR URINE: 1520
CREATININE 24 HOUR UR: 5.2 MG/DL
MAGNESIUM: 2.2 MG/DL
PHOSPHORUS: 3.9 MG/DL
PTH INTACT: 23
TSH SERPL DL<=0.05 MIU/L-ACNC: 1.92 UIU/ML
URINE VOLUME, 24 HOUR: NORMAL
VITAMIN D 25-HYDROXY: 32.1
VITAMIN D2, 25 HYDROXY: NORMAL
VITAMIN D3,25 HYDROXY: NORMAL

## 2022-11-30 DIAGNOSIS — M81.0 OSTEOPOROSIS WITHOUT CURRENT PATHOLOGICAL FRACTURE, UNSPECIFIED OSTEOPOROSIS TYPE: Primary | ICD-10-CM

## 2022-11-30 RX ORDER — ALENDRONATE SODIUM 70 MG/1
70 TABLET ORAL
Qty: 4 TABLET | Refills: 1 | Status: SHIPPED | OUTPATIENT
Start: 2022-11-30

## 2022-11-30 NOTE — PROGRESS NOTES
Diagnosis Orders   1. Osteoporosis without current pathological fracture, unspecified osteoporosis type  alendronate (FOSAMAX) 70 MG tablet        -- Repeat labs in 4 to 8 weeks.

## 2022-12-21 DIAGNOSIS — M81.0 OSTEOPOROSIS WITHOUT CURRENT PATHOLOGICAL FRACTURE, UNSPECIFIED OSTEOPOROSIS TYPE: ICD-10-CM

## 2022-12-21 DIAGNOSIS — M06.30 RHEUMATOID NODULOSIS (HCC): ICD-10-CM

## 2022-12-21 DIAGNOSIS — M05.79 RHEUMATOID ARTHRITIS INVOLVING MULTIPLE SITES WITH POSITIVE RHEUMATOID FACTOR (HCC): ICD-10-CM

## 2022-12-21 LAB
ALBUMIN SERPL-MCNC: 3.9 G/DL
ALP BLD-CCNC: 78 U/L
ALT SERPL-CCNC: 14 U/L
ANION GAP SERPL CALCULATED.3IONS-SCNC: 2 MMOL/L
AST SERPL-CCNC: 17 U/L
BASOPHILS ABSOLUTE: 0.1 /ΜL
BASOPHILS RELATIVE PERCENT: 1 %
BILIRUB SERPL-MCNC: 0.9 MG/DL (ref 0.1–1.4)
BUN BLDV-MCNC: 12 MG/DL
C-REACTIVE PROTEIN: 1
CALCIUM SERPL-MCNC: 8.8 MG/DL
CHLORIDE BLD-SCNC: 104 MMOL/L
CO2: 25 MMOL/L
CREAT SERPL-MCNC: 0.68 MG/DL
EOSINOPHILS ABSOLUTE: 0.2 /ΜL
EOSINOPHILS RELATIVE PERCENT: 5 %
GFR CALCULATED: 97
GLUCOSE BLD-MCNC: 98 MG/DL
HCT VFR BLD CALC: 37.5 % (ref 36–46)
HEMOGLOBIN: 12.6 G/DL (ref 12–16)
LYMPHOCYTES ABSOLUTE: 1.9 /ΜL
LYMPHOCYTES RELATIVE PERCENT: 41 %
MCH RBC QN AUTO: 32.2 PG
MCHC RBC AUTO-ENTMCNC: 33.6 G/DL
MCV RBC AUTO: 96 FL
MONOCYTES ABSOLUTE: 0.5 /ΜL
MONOCYTES RELATIVE PERCENT: 11 %
NEUTROPHILS ABSOLUTE: 1.9 /ΜL
NEUTROPHILS RELATIVE PERCENT: 42 %
PDW BLD-RTO: 13.1 %
PLATELET # BLD: 299 K/ΜL
PMV BLD AUTO: NORMAL FL
POTASSIUM SERPL-SCNC: 3.8 MMOL/L
RBC # BLD: 3.91 10^6/ΜL
SEDIMENTATION RATE, ERYTHROCYTE: 12
SODIUM BLD-SCNC: 142 MMOL/L
TOTAL PROTEIN: 5.9
WBC # BLD: 4.5 10^3/ML

## 2022-12-21 RX ORDER — ALENDRONATE SODIUM 70 MG/1
TABLET ORAL
Qty: 4 TABLET | Refills: 1 | OUTPATIENT
Start: 2022-12-21

## 2023-01-17 DIAGNOSIS — M81.0 OSTEOPOROSIS WITHOUT CURRENT PATHOLOGICAL FRACTURE, UNSPECIFIED OSTEOPOROSIS TYPE: ICD-10-CM

## 2023-01-17 RX ORDER — ALENDRONATE SODIUM 70 MG/1
TABLET ORAL
Qty: 4 TABLET | Refills: 1 | Status: SHIPPED | OUTPATIENT
Start: 2023-01-17

## 2023-02-01 RX ORDER — ACETAMINOPHEN 325 MG/1
650 TABLET ORAL
OUTPATIENT
Start: 2023-02-08

## 2023-02-01 RX ORDER — ACETAMINOPHEN 325 MG/1
650 TABLET ORAL ONCE
OUTPATIENT
Start: 2023-02-08

## 2023-02-01 RX ORDER — SODIUM CHLORIDE 9 MG/ML
INJECTION, SOLUTION INTRAVENOUS CONTINUOUS
OUTPATIENT
Start: 2023-02-08

## 2023-02-01 RX ORDER — EPINEPHRINE 1 MG/ML
0.3 INJECTION, SOLUTION, CONCENTRATE INTRAVENOUS PRN
OUTPATIENT
Start: 2023-02-08

## 2023-02-01 RX ORDER — DIPHENHYDRAMINE HCL 25 MG
25 TABLET ORAL ONCE
OUTPATIENT
Start: 2023-02-08 | End: 2023-02-08

## 2023-02-01 RX ORDER — ALBUTEROL SULFATE 90 UG/1
4 AEROSOL, METERED RESPIRATORY (INHALATION) PRN
OUTPATIENT
Start: 2023-02-08

## 2023-02-01 RX ORDER — DIPHENHYDRAMINE HYDROCHLORIDE 50 MG/ML
50 INJECTION INTRAMUSCULAR; INTRAVENOUS
OUTPATIENT
Start: 2023-02-08

## 2023-02-01 RX ORDER — SODIUM CHLORIDE 0.9 % (FLUSH) 0.9 %
5-40 SYRINGE (ML) INJECTION PRN
OUTPATIENT
Start: 2023-02-08

## 2023-02-01 RX ORDER — ONDANSETRON 2 MG/ML
8 INJECTION INTRAMUSCULAR; INTRAVENOUS
OUTPATIENT
Start: 2023-02-08

## 2023-02-01 RX ORDER — SODIUM CHLORIDE 9 MG/ML
5-250 INJECTION, SOLUTION INTRAVENOUS PRN
OUTPATIENT
Start: 2023-02-08

## 2023-02-01 RX ORDER — FAMOTIDINE 10 MG/ML
20 INJECTION, SOLUTION INTRAVENOUS
OUTPATIENT
Start: 2023-02-08

## 2023-02-01 RX ORDER — HEPARIN SODIUM (PORCINE) LOCK FLUSH IV SOLN 100 UNIT/ML 100 UNIT/ML
500 SOLUTION INTRAVENOUS PRN
OUTPATIENT
Start: 2023-02-08

## 2023-02-01 RX ORDER — METHYLPREDNISOLONE SODIUM SUCCINATE 125 MG/2ML
40 INJECTION, POWDER, LYOPHILIZED, FOR SOLUTION INTRAMUSCULAR; INTRAVENOUS ONCE
OUTPATIENT
Start: 2023-02-08 | End: 2023-02-08

## 2023-02-01 NOTE — PROGRESS NOTES
Patient switched to [facility]. Previously insurance required her to do home infusions (has been getting through [facility]), will place new orders for outpatient nursing for infliximab and see if we can get insurance approval. Next infusion is due 2/8/2023.

## 2023-02-11 DIAGNOSIS — M06.30 RHEUMATOID NODULOSIS (HCC): ICD-10-CM

## 2023-02-11 DIAGNOSIS — M05.79 RHEUMATOID ARTHRITIS INVOLVING MULTIPLE SITES WITH POSITIVE RHEUMATOID FACTOR (HCC): ICD-10-CM

## 2023-02-13 LAB
ALBUMIN SERPL-MCNC: 4.1 G/DL
ALP BLD-CCNC: 78 U/L
ALT SERPL-CCNC: 17 U/L
ANION GAP SERPL CALCULATED.3IONS-SCNC: NORMAL MMOL/L
AST SERPL-CCNC: 21 U/L
BASOPHILS ABSOLUTE: 0.1 /ΜL
BASOPHILS RELATIVE PERCENT: 1 %
BILIRUB SERPL-MCNC: 0.9 MG/DL (ref 0.1–1.4)
BUN BLDV-MCNC: 15 MG/DL
C-REACTIVE PROTEIN: 2
CALCIUM SERPL-MCNC: 9.5 MG/DL
CHLORIDE BLD-SCNC: 106 MMOL/L
CO2: 24 MMOL/L
CREAT SERPL-MCNC: 0.68 MG/DL
EGFR: 97
EOSINOPHILS ABSOLUTE: 0.2 /ΜL
EOSINOPHILS RELATIVE PERCENT: 3 %
GLUCOSE BLD-MCNC: 84 MG/DL
HCT VFR BLD CALC: 39.2 % (ref 36–46)
HEMOGLOBIN: 13.3 G/DL (ref 12–16)
LYMPHOCYTES ABSOLUTE: 1.6 /ΜL
LYMPHOCYTES RELATIVE PERCENT: 25 %
MCH RBC QN AUTO: 32.5 PG
MCHC RBC AUTO-ENTMCNC: 33.9 G/DL
MCV RBC AUTO: 96 FL
MONOCYTES ABSOLUTE: 0.6 /ΜL
MONOCYTES RELATIVE PERCENT: 10 %
NEUTROPHILS ABSOLUTE: 3.7 /ΜL
NEUTROPHILS RELATIVE PERCENT: 61 %
PDW BLD-RTO: 13.2 %
PLATELET # BLD: 295 K/ΜL
PMV BLD AUTO: NORMAL FL
POTASSIUM SERPL-SCNC: 4.5 MMOL/L
RBC # BLD: 4.09 10^6/ΜL
SEDIMENTATION RATE, ERYTHROCYTE: 17
SODIUM BLD-SCNC: 141 MMOL/L
TOTAL PROTEIN: 6.1
WBC # BLD: 6.1 10^3/ML

## 2023-02-14 RX ORDER — METHOTREXATE 2.5 MG/1
TABLET ORAL
Qty: 16 TABLET | Refills: 1 | OUTPATIENT
Start: 2023-02-14

## 2023-02-15 DIAGNOSIS — M05.79 RHEUMATOID ARTHRITIS INVOLVING MULTIPLE SITES WITH POSITIVE RHEUMATOID FACTOR (HCC): ICD-10-CM

## 2023-02-15 DIAGNOSIS — M06.30 RHEUMATOID NODULOSIS (HCC): ICD-10-CM

## 2023-02-18 DIAGNOSIS — M81.0 OSTEOPOROSIS WITHOUT CURRENT PATHOLOGICAL FRACTURE, UNSPECIFIED OSTEOPOROSIS TYPE: ICD-10-CM

## 2023-02-20 RX ORDER — ALENDRONATE SODIUM 70 MG/1
TABLET ORAL
Qty: 4 TABLET | Refills: 1 | OUTPATIENT
Start: 2023-02-20

## 2023-02-23 ENCOUNTER — HOSPITAL ENCOUNTER (OUTPATIENT)
Dept: NURSING | Age: 63
Discharge: HOME | End: 2023-02-23
Payer: MEDICARE

## 2023-02-23 VITALS
SYSTOLIC BLOOD PRESSURE: 122 MMHG | TEMPERATURE: 99.6 F | WEIGHT: 125 LBS | BODY MASS INDEX: 23.63 KG/M2 | RESPIRATION RATE: 18 BRPM | OXYGEN SATURATION: 98 % | HEART RATE: 73 BPM | DIASTOLIC BLOOD PRESSURE: 65 MMHG

## 2023-02-23 DIAGNOSIS — M05.79 RHEUMATOID ARTHRITIS INVOLVING MULTIPLE SITES WITH POSITIVE RHEUMATOID FACTOR (HCC): Primary | ICD-10-CM

## 2023-02-23 PROCEDURE — 96366 THER/PROPH/DIAG IV INF ADDON: CPT

## 2023-02-23 PROCEDURE — 96365 THER/PROPH/DIAG IV INF INIT: CPT

## 2023-02-23 PROCEDURE — 6370000000 HC RX 637 (ALT 250 FOR IP): Performed by: NURSE PRACTITIONER

## 2023-02-23 PROCEDURE — 6360000002 HC RX W HCPCS: Performed by: NURSE PRACTITIONER

## 2023-02-23 PROCEDURE — 2580000003 HC RX 258: Performed by: NURSE PRACTITIONER

## 2023-02-23 RX ORDER — SODIUM CHLORIDE 9 MG/ML
5-250 INJECTION, SOLUTION INTRAVENOUS PRN
OUTPATIENT
Start: 2023-04-20

## 2023-02-23 RX ORDER — SODIUM CHLORIDE 9 MG/ML
INJECTION, SOLUTION INTRAVENOUS CONTINUOUS
OUTPATIENT
Start: 2023-04-20

## 2023-02-23 RX ORDER — ACETAMINOPHEN 325 MG/1
650 TABLET ORAL
OUTPATIENT
Start: 2023-04-20

## 2023-02-23 RX ORDER — ACETAMINOPHEN 325 MG/1
650 TABLET ORAL ONCE
OUTPATIENT
Start: 2023-04-20

## 2023-02-23 RX ORDER — ONDANSETRON 2 MG/ML
8 INJECTION INTRAMUSCULAR; INTRAVENOUS
OUTPATIENT
Start: 2023-04-20

## 2023-02-23 RX ORDER — METHYLPREDNISOLONE SODIUM SUCCINATE 40 MG/ML
40 INJECTION, POWDER, LYOPHILIZED, FOR SOLUTION INTRAMUSCULAR; INTRAVENOUS ONCE
Status: CANCELLED | OUTPATIENT
Start: 2023-04-20 | End: 2023-04-20

## 2023-02-23 RX ORDER — SODIUM CHLORIDE 0.9 % (FLUSH) 0.9 %
5-40 SYRINGE (ML) INJECTION PRN
OUTPATIENT
Start: 2023-04-20

## 2023-02-23 RX ORDER — DIPHENHYDRAMINE HCL 25 MG
25 TABLET ORAL ONCE
Status: COMPLETED | OUTPATIENT
Start: 2023-02-23 | End: 2023-02-23

## 2023-02-23 RX ORDER — HEPARIN SODIUM (PORCINE) LOCK FLUSH IV SOLN 100 UNIT/ML 100 UNIT/ML
500 SOLUTION INTRAVENOUS PRN
OUTPATIENT
Start: 2023-04-20

## 2023-02-23 RX ORDER — SODIUM CHLORIDE 9 MG/ML
5-250 INJECTION, SOLUTION INTRAVENOUS PRN
Status: DISCONTINUED | OUTPATIENT
Start: 2023-02-23 | End: 2023-02-24 | Stop reason: HOSPADM

## 2023-02-23 RX ORDER — DIPHENHYDRAMINE HYDROCHLORIDE 50 MG/ML
50 INJECTION INTRAMUSCULAR; INTRAVENOUS
OUTPATIENT
Start: 2023-04-20

## 2023-02-23 RX ORDER — ACETAMINOPHEN 325 MG/1
650 TABLET ORAL ONCE
Status: COMPLETED | OUTPATIENT
Start: 2023-02-23 | End: 2023-02-23

## 2023-02-23 RX ORDER — METHYLPREDNISOLONE SODIUM SUCCINATE 40 MG/ML
40 INJECTION, POWDER, LYOPHILIZED, FOR SOLUTION INTRAMUSCULAR; INTRAVENOUS ONCE
Status: COMPLETED | OUTPATIENT
Start: 2023-02-23 | End: 2023-02-23

## 2023-02-23 RX ORDER — DIPHENHYDRAMINE HCL 25 MG
25 TABLET ORAL ONCE
OUTPATIENT
Start: 2023-04-20 | End: 2023-04-20

## 2023-02-23 RX ORDER — SODIUM CHLORIDE 0.9 % (FLUSH) 0.9 %
5-40 SYRINGE (ML) INJECTION PRN
Status: DISCONTINUED | OUTPATIENT
Start: 2023-02-23 | End: 2023-02-24 | Stop reason: HOSPADM

## 2023-02-23 RX ORDER — ALBUTEROL SULFATE 90 UG/1
4 AEROSOL, METERED RESPIRATORY (INHALATION) PRN
OUTPATIENT
Start: 2023-04-20

## 2023-02-23 RX ADMIN — METHYLPREDNISOLONE SODIUM SUCCINATE 40 MG: 40 INJECTION, POWDER, FOR SOLUTION INTRAMUSCULAR; INTRAVENOUS at 11:39

## 2023-02-23 RX ADMIN — SODIUM CHLORIDE 20 ML/HR: 9 INJECTION, SOLUTION INTRAVENOUS at 11:59

## 2023-02-23 RX ADMIN — ACETAMINOPHEN 650 MG: 325 TABLET ORAL at 11:39

## 2023-02-23 RX ADMIN — DIPHENHYDRAMINE HYDROCHLORIDE 25 MG: 25 TABLET ORAL at 11:39

## 2023-02-23 RX ADMIN — INFLIXIMAB 200 MG: 100 INJECTION, POWDER, LYOPHILIZED, FOR SOLUTION INTRAVENOUS at 12:48

## 2023-02-23 NOTE — DISCHARGE INSTRUCTIONS
DISCHARGE INSTRUCTION SHEET        YOU ARE ADVISED TO CARRY OUT THE FOLLOWING INSTRUCTIONS:    DIET:  As tolerated    ACTIVITY:  As tolerated    OTHER:  Call your doctor or return to the nearest Emergency Room if:     A.   Within 2 hrs:  Dizziness, trouble breathing, rash   B.  3 - 12 days after infusion:  Fever, rash, sore throat, swelling to hands/face,  trouble breathing or swelling, joint pain, cough, or fatigue        Next  infusion is scheduled on:  Thursday April 20th, 2023  at: 11:30am

## 2023-02-23 NOTE — PROGRESS NOTES
1130 pt arrives ambulatory for renflexis infusion. Infusion explained and questions answered. PT RIGHTS AND RESPONSIBILITIES OFFERED TO PT. Pt denies recent infection or ATB use. 1230 pt resting quietly. Denies needs. 1330 infusion continues. Pt denies needs. 1430 pt ambulating to restroom. Infusion continues. 1500 infusion complete. Pt tolerated it well with no complaints. Pt discharged ambulatory with instructions with no complaints.                      __m__ Safety:       (Environmental)  [place] to environment  Ensure ID band is correct and in place/ allergy band as needed  Assess for fall risk  Initiate fall precautions as applicable (fall band, side rails, etc.)  Call light within reach  Bed in low position/ wheels locked    __m__ Pain:       Assess pain level and characteristics  Administer analgesics as ordered  Assess effectiveness of pain management and report to MD as needed    _m___ Knowledge Deficit:  Assess baseline knowledge  Provide teaching at level of understanding  Provide teaching via preferred learning method  Evaluate teaching effectiveness    _m___ Hemodynamic/Respiratory Status:       (Pre and Post Procedure Monitoring)  Assess/Monitor vital signs and LOC  Assess Baseline SpO2 prior to any sedation  Obtain weight/height  Assess vital signs/ LOC until patient meets discharge criteria  Monitor procedure site and notify MD of any issues

## 2023-03-02 ENCOUNTER — OFFICE VISIT (OUTPATIENT)
Dept: RHEUMATOLOGY | Age: 63
End: 2023-03-02
Payer: MEDICARE

## 2023-03-02 VITALS
OXYGEN SATURATION: 98 % | BODY MASS INDEX: 23.33 KG/M2 | HEART RATE: 77 BPM | WEIGHT: 123.6 LBS | HEIGHT: 61 IN | SYSTOLIC BLOOD PRESSURE: 100 MMHG | DIASTOLIC BLOOD PRESSURE: 62 MMHG

## 2023-03-02 DIAGNOSIS — Z51.81 MEDICATION MONITORING ENCOUNTER: ICD-10-CM

## 2023-03-02 DIAGNOSIS — M81.0 OSTEOPOROSIS WITHOUT CURRENT PATHOLOGICAL FRACTURE, UNSPECIFIED OSTEOPOROSIS TYPE: ICD-10-CM

## 2023-03-02 DIAGNOSIS — M05.79 RHEUMATOID ARTHRITIS INVOLVING MULTIPLE SITES WITH POSITIVE RHEUMATOID FACTOR (HCC): Primary | ICD-10-CM

## 2023-03-02 DIAGNOSIS — M06.30 RHEUMATOID NODULOSIS (HCC): ICD-10-CM

## 2023-03-02 PROCEDURE — 1090F PRES/ABSN URINE INCON ASSESS: CPT | Performed by: INTERNAL MEDICINE

## 2023-03-02 PROCEDURE — G8399 PT W/DXA RESULTS DOCUMENT: HCPCS | Performed by: INTERNAL MEDICINE

## 2023-03-02 PROCEDURE — G8420 CALC BMI NORM PARAMETERS: HCPCS | Performed by: INTERNAL MEDICINE

## 2023-03-02 PROCEDURE — G8482 FLU IMMUNIZE ORDER/ADMIN: HCPCS | Performed by: INTERNAL MEDICINE

## 2023-03-02 PROCEDURE — G8427 DOCREV CUR MEDS BY ELIG CLIN: HCPCS | Performed by: INTERNAL MEDICINE

## 2023-03-02 PROCEDURE — 99214 OFFICE O/P EST MOD 30 MIN: CPT | Performed by: INTERNAL MEDICINE

## 2023-03-02 PROCEDURE — 3017F COLORECTAL CA SCREEN DOC REV: CPT | Performed by: INTERNAL MEDICINE

## 2023-03-02 PROCEDURE — 1036F TOBACCO NON-USER: CPT | Performed by: INTERNAL MEDICINE

## 2023-03-02 PROCEDURE — 1123F ACP DISCUSS/DSCN MKR DOCD: CPT | Performed by: INTERNAL MEDICINE

## 2023-03-02 ASSESSMENT — ENCOUNTER SYMPTOMS
EYE PAIN: 0
DIARRHEA: 0
COUGH: 0
SHORTNESS OF BREATH: 0
NAUSEA: 0
ABDOMINAL PAIN: 0
TROUBLE SWALLOWING: 0
BACK PAIN: 0
EYE ITCHING: 0
CONSTIPATION: 0

## 2023-03-02 NOTE — PROGRESS NOTES
[facility] RHEUMATOLOGY FOLLOW UP NOTE       Date Of Service: 3/2/2023  Provider: Jemima Wan DO    Name: Ema Allen   MRN: [id number]    Chief Complaint(s)     Chief Complaint   Patient presents with    Follow-up     4 month follow up rheumatoid nodulosis         History of Present Illness (HPI)     Ema Allen  is a(n)63 y.o. female with a hx of osteoarthritis bilateral knees, rheumatoid arthritis, left hand knuckle replacement, left elbow nodule removal (2010), nodule removals bilateral feet (2005) here for the f/u evaluation of rheumatoid arthritis. Rheumatoid arthritis / osteoarthritis     -- no active nodules    -- mild aching pain in the left knee. - scheduled knee injection 3/3/2023. -- swelling or morning stiffness associated w/ the RA. osteoporosis - DEXA nov. 22 w/ T-score of -3.3 in the lower back. stable, no falls, weakness, .reported alendronate complaince. Lower back pain - intermittent, localized, mild. Denies radicular pain, numbness, weakness,       REVIEW OF SYSTEMS: (ROS)    Review of Systems   Constitutional:  Negative for fatigue, fever and unexpected weight change. HENT:  Negative for congestion and trouble swallowing. Eyes:  Negative for pain and itching. Respiratory:  Negative for cough and shortness of breath. Cardiovascular:  Negative for chest pain and leg swelling. Gastrointestinal:  Negative for abdominal pain, constipation, diarrhea and nausea. Endocrine: Negative for cold intolerance and heat intolerance. Genitourinary:  Negative for difficulty urinating, frequency and urgency. Musculoskeletal:  Negative for arthralgias, back pain and joint swelling. Skin:  Negative for rash. Neurological:  Negative for dizziness, weakness, numbness and headaches. Psychiatric/Behavioral:  The patient is not nervous/anxious. PmHx:  has a past medical history of High cholesterol, Hypertension, and RA (rheumatoid arthritis) ([facility] [address] Social History:  reports that she has never smoked. She has never used smokeless tobacco. She reports that she does not drink alcohol and does not use drugs. No Known Allergies    CURRENT MEDICATIONS      Current Outpatient Medications:     methotrexate (RHEUMATREX) 2.5 MG chemo tablet, Take 4 tablets by mouth once a week, Disp: 16 tablet, Rfl: 1    alendronate (FOSAMAX) 70 MG tablet, TAKE 1 TABLET BY MOUTH ONE TIME PER WEEK, Disp: 4 tablet, Rfl: 1    folic acid (FOLVITE) 1 MG tablet, Take 1 tablet by mouth daily, Disp: 60 tablet, Rfl: 2    meloxicam (MOBIC) 15 MG tablet, TAKE 1 TABLET BY MOUTH EVERY DAY prn pain, Disp: 90 tablet, Rfl: 1    lisinopril (PRINIVIL;ZESTRIL) 20 MG tablet, TAKE 1 TABLET BY MOUTH EVERY DAY AS DIRECTED, Disp: , Rfl:     atorvastatin (LIPITOR) 20 MG tablet, TAKE 1 TABLET BY MOUTH ONCE DAILY, Disp: , Rfl:         PHYSICAL EXAMINATION / OBJECTIVE   Objective:  /62 (Site: Left Upper Arm, Position: Sitting, Cuff Size: Large Adult)   Pulse 77   Ht 5' 0.98\" (1.549 m)   Wt 123 lb 9.6 oz (56.1 kg)   SpO2 98%   BMI 23.37 kg/m²     Physical Exam  Vitals reviewed. Constitutional:       Appearance: She is well-developed. Cardiovascular:      Rate and Rhythm: Normal rate and regular rhythm. Pulmonary:      Effort: Pulmonary effort is normal.      Breath sounds: Normal breath sounds. Musculoskeletal:      Cervical back: Normal range of motion and neck supple. Skin:     General: Skin is warm and dry. Findings: No rash. Comments: nodulsosis bilat elbows, right thumb, right 3rd distal finger , left lateral ankles, plantar aspect of MTPs   Neurological:      Mental Status: She is alert and oriented to person, place, and time. Psychiatric:         Thought Content:  Thought content normal.     Upper extremities:    Nontender, + bogginess bilat 2,3 MCPs    Lower extremities:  Nontender, no swelling         LABS    CBC  Lab Results   Component Value Date/Time    WBC 6.1 02/13/2023 12:00 AM    RBC 4.09 02/13/2023 12:00 AM    HGB 13.3 02/13/2023 12:00 AM    HCT 39.2 02/13/2023 12:00 AM    MCV 96 02/13/2023 12:00 AM    MCH 32.5 02/13/2023 12:00 AM    MCHC 33.9 02/13/2023 12:00 AM    RDW 13.2 02/13/2023 12:00 AM     02/13/2023 12:00 AM       CMP  Lab Results   Component Value Date/Time    CALCIUM 9.5 02/13/2023 12:00 AM    LABALBU 4.1 02/13/2023 12:00 AM    PROT 6.9 05/10/2021 03:53 PM     02/13/2023 12:00 AM    K 4.5 02/13/2023 12:00 AM    CO2 24 02/13/2023 12:00 AM     02/13/2023 12:00 AM    BUN 15 02/13/2023 12:00 AM    CREATININE 0.68 02/13/2023 12:00 AM    ALKPHOS 78 02/13/2023 12:00 AM    ALT 17 02/13/2023 12:00 AM    AST 21 02/13/2023 12:00 AM       HgBA1c: No components found for: HGBA1C    Lab Results   Component Value Date/Time    VITD25 32.1 11/28/2022 12:00 AM         No results found for: ANASCRN  No results found for: SSA  No results found for: SSB  No results found for: ANTI-SMITH  No results found for: DSDNAAB   No results found for: ANTIRNP  No results found for: C3, C4  No results found for: CCPAB  Lab Results   Component Value Date     (H) 05/10/2021       No components found for: CANCASCRN, APANCASCRN  Lab Results   Component Value Date    SEDRATE 17 02/13/2023     Lab Results   Component Value Date    CRP 2 02/13/2023       RADIOLOGY:         ASSESSMENT/PLAN    Assessment   Plan     Seropositive rheumatoid arthritis +RF, + CCP - remission. 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 Dr. Jose Yang started methotrexate + enbrel (2005, sig relief). Methotrexate stopped in 2018 due to ? Remission. + nodulosis bilat elbows with prior removal as well as removals on feet. Exam with no tenderness, + swelling/warmth left wrist, active nodulosis elbows and left ankle.   - prior tx: enbrel 25 mg BID (burning with injection)               - methotrexate 10 mg PO weekly & folic acid 1 mg daily (nov. 2021)    - infliximab 3 mg/kg q 8 weeks (9/7/2022)     Osteoarthritis of both knees, unspecified osteoarthritis type  - prior tx: cortisone injection (relief), synvisc (relief), s/p replacement 9/2021     osteoporosis - alendronate 70mg weekly (dec. 2022)     Medication monitoring    - CBC, CMP, sed rate, CRP q 12 weeks   - TB gold negative (June 2022)       No follow-ups on file. Electronically signed by Dejon Hua DO on 3/2/2023 at 10:49 AM    New Prescriptions    No medications on file       Thank you for allowing me to participate in the care of this patient. Please call if there are any questions.

## 2023-03-07 RX ORDER — MELOXICAM 15 MG/1
TABLET ORAL
Qty: 90 TABLET | Refills: 1 | Status: SHIPPED | OUTPATIENT
Start: 2023-03-07

## 2023-04-02 DIAGNOSIS — M81.0 OSTEOPOROSIS WITHOUT CURRENT PATHOLOGICAL FRACTURE, UNSPECIFIED OSTEOPOROSIS TYPE: ICD-10-CM

## 2023-04-03 RX ORDER — ALENDRONATE SODIUM 70 MG/1
TABLET ORAL
Qty: 4 TABLET | Refills: 1 | Status: SHIPPED | OUTPATIENT
Start: 2023-04-03

## 2023-04-04 DIAGNOSIS — M05.79 RHEUMATOID ARTHRITIS INVOLVING MULTIPLE SITES WITH POSITIVE RHEUMATOID FACTOR (HCC): ICD-10-CM

## 2023-04-04 DIAGNOSIS — M06.30 RHEUMATOID NODULOSIS (HCC): ICD-10-CM

## 2023-04-04 RX ORDER — FOLIC ACID 1 MG/1
1 TABLET ORAL DAILY
Qty: 60 TABLET | Refills: 2 | Status: SHIPPED | OUTPATIENT
Start: 2023-04-04

## 2023-04-19 DIAGNOSIS — M05.79 RHEUMATOID ARTHRITIS INVOLVING MULTIPLE SITES WITH POSITIVE RHEUMATOID FACTOR (HCC): ICD-10-CM

## 2023-04-19 DIAGNOSIS — M06.30 RHEUMATOID NODULOSIS (HCC): ICD-10-CM

## 2023-04-20 ENCOUNTER — HOSPITAL ENCOUNTER (OUTPATIENT)
Dept: NURSING | Age: 63
Discharge: HOME | End: 2023-04-20
Payer: MEDICARE

## 2023-04-20 VITALS
OXYGEN SATURATION: 99 % | RESPIRATION RATE: 18 BRPM | HEART RATE: 73 BPM | WEIGHT: 128 LBS | BODY MASS INDEX: 24.2 KG/M2 | DIASTOLIC BLOOD PRESSURE: 63 MMHG | SYSTOLIC BLOOD PRESSURE: 129 MMHG | TEMPERATURE: 96.9 F

## 2023-04-20 DIAGNOSIS — M05.79 RHEUMATOID ARTHRITIS INVOLVING MULTIPLE SITES WITH POSITIVE RHEUMATOID FACTOR (HCC): Primary | ICD-10-CM

## 2023-04-20 LAB
ALBUMIN SERPL-MCNC: 4.1 G/DL
ALP BLD-CCNC: 83 U/L
ALT SERPL-CCNC: 35 U/L
ANION GAP SERPL CALCULATED.3IONS-SCNC: NORMAL MMOL/L
AST SERPL-CCNC: 29 U/L
BASOPHILS ABSOLUTE: 0.1 /ΜL
BASOPHILS RELATIVE PERCENT: 1 %
BILIRUB SERPL-MCNC: 0.5 MG/DL (ref 0.1–1.4)
BUN BLDV-MCNC: 13 MG/DL
C-REACTIVE PROTEIN: 1
CALCIUM SERPL-MCNC: 8.9 MG/DL
CHLORIDE BLD-SCNC: 106 MMOL/L
CO2: 23 MMOL/L
CREAT SERPL-MCNC: 0.62 MG/DL
EGFR: 99
EOSINOPHILS ABSOLUTE: 0.3 /ΜL
EOSINOPHILS RELATIVE PERCENT: 5 %
GLUCOSE BLD-MCNC: 84 MG/DL
HCT VFR BLD CALC: 39.3 % (ref 36–46)
HEMOGLOBIN: 13.1 G/DL (ref 12–16)
LYMPHOCYTES ABSOLUTE: 1.5 /ΜL
LYMPHOCYTES RELATIVE PERCENT: 26 %
MCH RBC QN AUTO: 32.4 PG
MCHC RBC AUTO-ENTMCNC: 33.3 G/DL
MCV RBC AUTO: 97 FL
MONOCYTES ABSOLUTE: 0.5 /ΜL
MONOCYTES RELATIVE PERCENT: 8 %
NEUTROPHILS ABSOLUTE: 3.3 /ΜL
NEUTROPHILS RELATIVE PERCENT: 60 %
PDW BLD-RTO: 12.6 %
PLATELET # BLD: 277 K/ΜL
PMV BLD AUTO: NORMAL FL
POTASSIUM SERPL-SCNC: 4.3 MMOL/L
RBC # BLD: 4.04 10^6/ΜL
SEDIMENTATION RATE, ERYTHROCYTE: 7
SODIUM BLD-SCNC: 142 MMOL/L
TOTAL PROTEIN: 6.4
WBC # BLD: 5.6 10^3/ML

## 2023-04-20 PROCEDURE — 96365 THER/PROPH/DIAG IV INF INIT: CPT

## 2023-04-20 PROCEDURE — 2580000003 HC RX 258: Performed by: NURSE PRACTITIONER

## 2023-04-20 PROCEDURE — 6370000000 HC RX 637 (ALT 250 FOR IP): Performed by: NURSE PRACTITIONER

## 2023-04-20 PROCEDURE — 96366 THER/PROPH/DIAG IV INF ADDON: CPT

## 2023-04-20 PROCEDURE — 6360000002 HC RX W HCPCS: Performed by: NURSE PRACTITIONER

## 2023-04-20 RX ORDER — ONDANSETRON 2 MG/ML
8 INJECTION INTRAMUSCULAR; INTRAVENOUS
OUTPATIENT
Start: 2023-06-15

## 2023-04-20 RX ORDER — DIPHENHYDRAMINE HCL 25 MG
25 TABLET ORAL ONCE
Status: COMPLETED | OUTPATIENT
Start: 2023-04-20 | End: 2023-04-20

## 2023-04-20 RX ORDER — HEPARIN SODIUM (PORCINE) LOCK FLUSH IV SOLN 100 UNIT/ML 100 UNIT/ML
500 SOLUTION INTRAVENOUS PRN
OUTPATIENT
Start: 2023-06-15

## 2023-04-20 RX ORDER — SODIUM CHLORIDE 0.9 % (FLUSH) 0.9 %
5-40 SYRINGE (ML) INJECTION PRN
OUTPATIENT
Start: 2023-06-15

## 2023-04-20 RX ORDER — ACETAMINOPHEN 325 MG/1
650 TABLET ORAL ONCE
OUTPATIENT
Start: 2023-06-15

## 2023-04-20 RX ORDER — ALBUTEROL SULFATE 90 UG/1
4 AEROSOL, METERED RESPIRATORY (INHALATION) PRN
OUTPATIENT
Start: 2023-06-15

## 2023-04-20 RX ORDER — ACETAMINOPHEN 325 MG/1
650 TABLET ORAL
OUTPATIENT
Start: 2023-06-15

## 2023-04-20 RX ORDER — DIPHENHYDRAMINE HYDROCHLORIDE 50 MG/ML
50 INJECTION INTRAMUSCULAR; INTRAVENOUS
OUTPATIENT
Start: 2023-06-15

## 2023-04-20 RX ORDER — DIPHENHYDRAMINE HCL 25 MG
25 TABLET ORAL ONCE
OUTPATIENT
Start: 2023-06-15 | End: 2023-06-15

## 2023-04-20 RX ORDER — SODIUM CHLORIDE 9 MG/ML
INJECTION, SOLUTION INTRAVENOUS CONTINUOUS
OUTPATIENT
Start: 2023-06-15

## 2023-04-20 RX ORDER — SODIUM CHLORIDE 9 MG/ML
5-250 INJECTION, SOLUTION INTRAVENOUS PRN
OUTPATIENT
Start: 2023-06-15

## 2023-04-20 RX ORDER — ACETAMINOPHEN 325 MG/1
650 TABLET ORAL ONCE
Status: COMPLETED | OUTPATIENT
Start: 2023-04-20 | End: 2023-04-20

## 2023-04-20 RX ORDER — SODIUM CHLORIDE 9 MG/ML
5-250 INJECTION, SOLUTION INTRAVENOUS PRN
Status: DISCONTINUED | OUTPATIENT
Start: 2023-04-20 | End: 2023-04-21 | Stop reason: HOSPADM

## 2023-04-20 RX ORDER — EPINEPHRINE 1 MG/ML
0.3 INJECTION, SOLUTION INTRAMUSCULAR; SUBCUTANEOUS PRN
OUTPATIENT
Start: 2023-06-15

## 2023-04-20 RX ADMIN — INFLIXIMAB 200 MG: 100 INJECTION, POWDER, LYOPHILIZED, FOR SOLUTION INTRAVENOUS at 12:06

## 2023-04-20 RX ADMIN — SODIUM CHLORIDE 20 ML/HR: 9 INJECTION, SOLUTION INTRAVENOUS at 11:52

## 2023-04-20 RX ADMIN — DIPHENHYDRAMINE HYDROCHLORIDE 25 MG: 25 TABLET ORAL at 11:32

## 2023-04-20 RX ADMIN — ACETAMINOPHEN 650 MG: 325 TABLET ORAL at 11:32

## 2023-04-20 ASSESSMENT — PAIN - FUNCTIONAL ASSESSMENT: PAIN_FUNCTIONAL_ASSESSMENT: NONE - DENIES PAIN

## 2023-04-20 NOTE — TELEPHONE ENCOUNTER
Last labs were completed 02/13 next are due by 05/08 for further refills assumed labs would be needed as that date is in 2 1/2 weeks but pt is questioning. MTX was last filled 02/15 as a month supply allowing one refill pt should be good until around 05/03 by my estimated on current meds. Please confirm and advise what pt should do.

## 2023-04-20 NOTE — DISCHARGE INSTRUCTIONS
Next appointment is scheduled for [address] 15 at 11:30    Ragini Whitney      Can not have an active infection or be on an antibiotic at time of infusion. SIDE EFFECTS: RASH, COUGH, JOINT PAIN, SWELLING TO HANDS/FEET, SORE THROAT, FEVER      Call your doctor or return to the nearest Emergency Room if you start having shortness of breath, chest tightness, wheezing      Please call [phone] with any questions or concerns.

## 2023-04-20 NOTE — PROGRESS NOTES
1117 Patient arrived to [facility] ambulatory for renflexis infusion. Oriented to room and call light  PT RIGHTS AND RESPONSIBILITIES OFFERED TO PT. She denies recent infection or antibiotic use. 1132 Pre-medications given and she denies complaints    1206 Medication started and she denies complaints    1225 Medication infusing and she denies complaints    1240 Medication infusing and she denies complaints     1255 Medication infusing and she denies complaints    1310 Medication infusing and she denies complaints    1340 Medication infusing and she denies complaints    1420 Medication completed and she denies complaints. Iv removed. Discharge instructions given and explained and she denies questions.   Discharged ambulatory     _M___ Safety:       (Environmental)  [place] to environment  Ensure ID band is correct and in place/ allergy band as needed  Assess for fall risk  Initiate fall precautions as applicable (fall band, side rails, etc.)  Call light within reach  Bed in low position/ wheels locked    _M___ Pain:       Assess pain level and characteristics  Administer analgesics as ordered  Assess effectiveness of pain management and report to MD as needed    _M___ Knowledge Deficit:  Assess baseline knowledge  Provide teaching at level of understanding  Provide teaching via preferred learning method  Evaluate teaching effectiveness    _M___ Hemodynamic/Respiratory Status:       (Pre and Post Procedure Monitoring)  Assess/Monitor vital signs and LOC  Assess Baseline SpO2 prior to any sedation  Obtain weight/height  Assess vital signs/ LOC until patient meets discharge criteria  Monitor procedure site and notify MD of any issues

## 2023-04-24 DIAGNOSIS — M06.30 RHEUMATOID NODULOSIS (HCC): ICD-10-CM

## 2023-04-24 DIAGNOSIS — M05.79 RHEUMATOID ARTHRITIS INVOLVING MULTIPLE SITES WITH POSITIVE RHEUMATOID FACTOR (HCC): ICD-10-CM

## 2023-05-29 DIAGNOSIS — M81.0 OSTEOPOROSIS WITHOUT CURRENT PATHOLOGICAL FRACTURE, UNSPECIFIED OSTEOPOROSIS TYPE: ICD-10-CM

## 2023-05-30 RX ORDER — ALENDRONATE SODIUM 70 MG/1
TABLET ORAL
Qty: 4 TABLET | Refills: 1 | Status: SHIPPED | OUTPATIENT
Start: 2023-05-30

## 2023-06-15 ENCOUNTER — HOSPITAL ENCOUNTER (OUTPATIENT)
Dept: NURSING | Age: 63
Discharge: HOME | End: 2023-06-15
Payer: MEDICARE

## 2023-06-15 VITALS
WEIGHT: 126 LBS | BODY MASS INDEX: 23.82 KG/M2 | SYSTOLIC BLOOD PRESSURE: 103 MMHG | TEMPERATURE: 96.9 F | DIASTOLIC BLOOD PRESSURE: 59 MMHG | HEART RATE: 79 BPM | RESPIRATION RATE: 16 BRPM | OXYGEN SATURATION: 98 %

## 2023-06-15 DIAGNOSIS — M05.79 RHEUMATOID ARTHRITIS INVOLVING MULTIPLE SITES WITH POSITIVE RHEUMATOID FACTOR (HCC): Primary | ICD-10-CM

## 2023-06-15 PROCEDURE — 6370000000 HC RX 637 (ALT 250 FOR IP): Performed by: NURSE PRACTITIONER

## 2023-06-15 PROCEDURE — 6360000002 HC RX W HCPCS: Performed by: NURSE PRACTITIONER

## 2023-06-15 PROCEDURE — 96365 THER/PROPH/DIAG IV INF INIT: CPT

## 2023-06-15 PROCEDURE — 2580000003 HC RX 258: Performed by: NURSE PRACTITIONER

## 2023-06-15 PROCEDURE — 96366 THER/PROPH/DIAG IV INF ADDON: CPT

## 2023-06-15 RX ORDER — HEPARIN SODIUM 100 [USP'U]/ML
500 INJECTION, SOLUTION INTRAVENOUS PRN
OUTPATIENT
Start: 2023-08-10

## 2023-06-15 RX ORDER — SODIUM CHLORIDE 0.9 % (FLUSH) 0.9 %
5-40 SYRINGE (ML) INJECTION PRN
OUTPATIENT
Start: 2023-08-10

## 2023-06-15 RX ORDER — DIPHENHYDRAMINE HCL 25 MG
25 TABLET ORAL ONCE
OUTPATIENT
Start: 2023-08-10 | End: 2023-08-10

## 2023-06-15 RX ORDER — EPINEPHRINE 1 MG/ML
0.3 INJECTION, SOLUTION INTRAMUSCULAR; SUBCUTANEOUS PRN
OUTPATIENT
Start: 2023-08-10

## 2023-06-15 RX ORDER — SODIUM CHLORIDE 9 MG/ML
5-250 INJECTION, SOLUTION INTRAVENOUS PRN
OUTPATIENT
Start: 2023-08-10

## 2023-06-15 RX ORDER — DIPHENHYDRAMINE HYDROCHLORIDE 50 MG/ML
50 INJECTION INTRAMUSCULAR; INTRAVENOUS
OUTPATIENT
Start: 2023-08-10

## 2023-06-15 RX ORDER — DIPHENHYDRAMINE HCL 25 MG
25 TABLET ORAL ONCE
Status: COMPLETED | OUTPATIENT
Start: 2023-06-15 | End: 2023-06-15

## 2023-06-15 RX ORDER — ACETAMINOPHEN 325 MG/1
650 TABLET ORAL ONCE
OUTPATIENT
Start: 2023-08-10

## 2023-06-15 RX ORDER — SODIUM CHLORIDE 9 MG/ML
5-250 INJECTION, SOLUTION INTRAVENOUS PRN
Status: DISCONTINUED | OUTPATIENT
Start: 2023-06-15 | End: 2023-06-16 | Stop reason: HOSPADM

## 2023-06-15 RX ORDER — ACETAMINOPHEN 325 MG/1
650 TABLET ORAL ONCE
Status: COMPLETED | OUTPATIENT
Start: 2023-06-15 | End: 2023-06-15

## 2023-06-15 RX ORDER — ACETAMINOPHEN 325 MG/1
650 TABLET ORAL
OUTPATIENT
Start: 2023-08-10

## 2023-06-15 RX ORDER — SODIUM CHLORIDE 9 MG/ML
INJECTION, SOLUTION INTRAVENOUS CONTINUOUS
OUTPATIENT
Start: 2023-08-10

## 2023-06-15 RX ORDER — ALBUTEROL SULFATE 90 UG/1
4 AEROSOL, METERED RESPIRATORY (INHALATION) PRN
OUTPATIENT
Start: 2023-08-10

## 2023-06-15 RX ORDER — ONDANSETRON 2 MG/ML
8 INJECTION INTRAMUSCULAR; INTRAVENOUS
OUTPATIENT
Start: 2023-08-10

## 2023-06-15 RX ORDER — SODIUM CHLORIDE 0.9 % (FLUSH) 0.9 %
5-40 SYRINGE (ML) INJECTION PRN
Status: DISCONTINUED | OUTPATIENT
Start: 2023-06-15 | End: 2023-06-16 | Stop reason: HOSPADM

## 2023-06-15 RX ADMIN — ACETAMINOPHEN 650 MG: 325 TABLET ORAL at 11:48

## 2023-06-15 RX ADMIN — DIPHENHYDRAMINE HYDROCHLORIDE 25 MG: 25 TABLET ORAL at 11:48

## 2023-06-15 RX ADMIN — INFLIXIMAB 200 MG: 100 INJECTION, POWDER, LYOPHILIZED, FOR SOLUTION INTRAVENOUS at 12:32

## 2023-06-15 NOTE — DISCHARGE INSTRUCTIONS
RENFLEXIS DISCHARGE INSTRUCTION SHEET      Can not have an active infection or be on an antibiotic at time of infusion. SIDE EFFECTS: RASH, COUGH, JOINT PAIN, SWELLING TO HANDS/FEET, SORE THROAT, FEVER      Call your doctor or return to the nearest Emergency Room if you start having shortness of breath, chest tightness, wheezing      Next Renflexis infusion is scheduled on: August 10 @ 11:30am      Please call [phone] with any questions or concerns.

## 2023-06-15 NOTE — PROGRESS NOTES
1130- Patient arrived to [facility] ambulatory for Renflexis infusion. Patient denies antibiotic use and active infection. Patient denies changes since last infusion. Vitals stable. IV inserted. Call light placed within reach. PT RIGHTS AND RESPONSIBILITIES OFFERED TO PT.     1148- Pre meds given. Patient offered drink and snack. 1230- Infusion started. Patient resting. Denies needs. 1245- Infusion increased. Patient tolerated. Vitals stable. 1300- Infusion increased. Patient tolerated. Vitals stable. 1315- Infusion increased. Patient tolerated. Vitals stable. 1330- Infusion increased. Patient tolerated. Vitals stable. 1400- Infusion increased to max dose. Patient tolerated. Vitals stable. 1430- Infusion complete. Patient tolerated well with no issues. IV removed. Vitals stable. 1436- Discharge instructions reviewed with patient. Verbalized understanding with no further questions. AVS Provided. Discharged ambulatory to [facility] in stable condition.                        __M__ Safety:       (Environmental)  [place] to environment  Ensure ID band is correct and in place/ allergy band as needed  Assess for fall risk  Initiate fall precautions as applicable (fall band, side rails, etc.)  Call light within reach  Bed in low position/ wheels locked    __M__ Pain:       Assess pain level and characteristics  Administer analgesics as ordered  Assess effectiveness of pain management and report to MD as needed    __M__ Knowledge Deficit:  Assess baseline knowledge  Provide teaching at level of understanding  Provide teaching via preferred learning method  Evaluate teaching effectiveness    __M__ Hemodynamic/Respiratory Status:       (Pre and Post Procedure Monitoring)  Assess/Monitor vital signs and LOC  Assess Baseline SpO2 prior to any sedation  Obtain weight/height  Assess vital signs/ LOC until patient meets discharge criteria  Monitor procedure site and notify MD of any issues

## 2023-06-27 DIAGNOSIS — M81.0 OSTEOPOROSIS WITHOUT CURRENT PATHOLOGICAL FRACTURE, UNSPECIFIED OSTEOPOROSIS TYPE: ICD-10-CM

## 2023-06-27 RX ORDER — ALENDRONATE SODIUM 70 MG/1
TABLET ORAL
Qty: 4 TABLET | Refills: 1 | OUTPATIENT
Start: 2023-06-27

## 2023-08-10 ENCOUNTER — HOSPITAL ENCOUNTER (OUTPATIENT)
Dept: NURSING | Age: 63
Discharge: HOME | End: 2023-08-10
Payer: MEDICARE

## 2023-08-10 VITALS
BODY MASS INDEX: 24.2 KG/M2 | HEART RATE: 75 BPM | OXYGEN SATURATION: 94 % | RESPIRATION RATE: 18 BRPM | DIASTOLIC BLOOD PRESSURE: 64 MMHG | SYSTOLIC BLOOD PRESSURE: 135 MMHG | TEMPERATURE: 97.5 F | WEIGHT: 128 LBS

## 2023-08-10 DIAGNOSIS — M81.0 OSTEOPOROSIS WITHOUT CURRENT PATHOLOGICAL FRACTURE, UNSPECIFIED OSTEOPOROSIS TYPE: ICD-10-CM

## 2023-08-10 DIAGNOSIS — M05.79 RHEUMATOID ARTHRITIS INVOLVING MULTIPLE SITES WITH POSITIVE RHEUMATOID FACTOR (HCC): Primary | ICD-10-CM

## 2023-08-10 PROCEDURE — 2580000003 HC RX 258: Performed by: NURSE PRACTITIONER

## 2023-08-10 PROCEDURE — 6370000000 HC RX 637 (ALT 250 FOR IP): Performed by: NURSE PRACTITIONER

## 2023-08-10 PROCEDURE — 96365 THER/PROPH/DIAG IV INF INIT: CPT

## 2023-08-10 PROCEDURE — 6360000002 HC RX W HCPCS: Performed by: NURSE PRACTITIONER

## 2023-08-10 RX ORDER — DIPHENHYDRAMINE HYDROCHLORIDE 50 MG/ML
50 INJECTION INTRAMUSCULAR; INTRAVENOUS
OUTPATIENT
Start: 2023-10-05

## 2023-08-10 RX ORDER — SODIUM CHLORIDE 9 MG/ML
INJECTION, SOLUTION INTRAVENOUS CONTINUOUS
OUTPATIENT
Start: 2023-10-05

## 2023-08-10 RX ORDER — EPINEPHRINE 1 MG/ML
0.3 INJECTION, SOLUTION INTRAMUSCULAR; SUBCUTANEOUS PRN
OUTPATIENT
Start: 2023-10-05

## 2023-08-10 RX ORDER — SODIUM CHLORIDE 9 MG/ML
5-250 INJECTION, SOLUTION INTRAVENOUS PRN
Status: DISCONTINUED | OUTPATIENT
Start: 2023-08-10 | End: 2023-08-11 | Stop reason: HOSPADM

## 2023-08-10 RX ORDER — HEPARIN 100 UNIT/ML
500 SYRINGE INTRAVENOUS PRN
OUTPATIENT
Start: 2023-10-05

## 2023-08-10 RX ORDER — ACETAMINOPHEN 325 MG/1
650 TABLET ORAL
OUTPATIENT
Start: 2023-10-05

## 2023-08-10 RX ORDER — ONDANSETRON 2 MG/ML
8 INJECTION INTRAMUSCULAR; INTRAVENOUS
OUTPATIENT
Start: 2023-10-05

## 2023-08-10 RX ORDER — SODIUM CHLORIDE 9 MG/ML
5-250 INJECTION, SOLUTION INTRAVENOUS PRN
OUTPATIENT
Start: 2023-10-05

## 2023-08-10 RX ORDER — ACETAMINOPHEN 325 MG/1
650 TABLET ORAL ONCE
OUTPATIENT
Start: 2023-10-05

## 2023-08-10 RX ORDER — ALENDRONATE SODIUM 70 MG/1
TABLET ORAL
Qty: 4 TABLET | Refills: 1 | Status: SHIPPED | OUTPATIENT
Start: 2023-08-10

## 2023-08-10 RX ORDER — ACETAMINOPHEN 325 MG/1
650 TABLET ORAL ONCE
Status: COMPLETED | OUTPATIENT
Start: 2023-08-10 | End: 2023-08-10

## 2023-08-10 RX ORDER — ALBUTEROL SULFATE 90 UG/1
4 AEROSOL, METERED RESPIRATORY (INHALATION) PRN
OUTPATIENT
Start: 2023-10-05

## 2023-08-10 RX ORDER — DIPHENHYDRAMINE HCL 25 MG
25 TABLET ORAL ONCE
OUTPATIENT
Start: 2023-10-05 | End: 2023-10-05

## 2023-08-10 RX ORDER — SODIUM CHLORIDE 0.9 % (FLUSH) 0.9 %
5-40 SYRINGE (ML) INJECTION PRN
OUTPATIENT
Start: 2023-10-05

## 2023-08-10 RX ORDER — DIPHENHYDRAMINE HCL 25 MG
25 TABLET ORAL ONCE
Status: COMPLETED | OUTPATIENT
Start: 2023-08-10 | End: 2023-08-10

## 2023-08-10 RX ADMIN — SODIUM CHLORIDE 20 ML/HR: 9 INJECTION, SOLUTION INTRAVENOUS at 11:44

## 2023-08-10 RX ADMIN — DIPHENHYDRAMINE HYDROCHLORIDE 25 MG: 25 TABLET ORAL at 11:34

## 2023-08-10 RX ADMIN — ACETAMINOPHEN 650 MG: 325 TABLET ORAL at 11:33

## 2023-08-10 RX ADMIN — INFLIXIMAB 200 MG: 100 INJECTION, POWDER, LYOPHILIZED, FOR SOLUTION INTRAVENOUS at 11:58

## 2023-08-10 NOTE — DISCHARGE INSTRUCTIONS
RENFLEXIS DISCHARGE INSTRUCTION SHEET      Can not have an active infection or be on an antibiotic at time of infusion. SIDE EFFECTS: RASH, COUGH, JOINT PAIN, SWELLING TO HANDS/FEET, SORE THROAT, FEVER      Call your doctor or return to the nearest Emergency Room if you start having shortness of breath, chest tightness, wheezing      Next Renflexis infusion is scheduled on: October 9th at 11:30 AM      Please call [phone] with any questions or concerns.

## 2023-08-10 NOTE — PROGRESS NOTES
1120: Patient arrived ambulatory for Renflexis infusion. Patient rights and responsibilities offered to patient. Patient denies any antibiotic usage or infection at this time. 1133: Pre-medications administered. Patient tolerated well. 1158: Renflexis infusion started. Patient tolerating well.     1302: Infusion complete, patient tolerated well. No concerns voiced. AVS reviewed with patient, voiced understanding. Patient discharged ambulatory.                            _m___ Safety:       (Environmental)  [place] to environment  Ensure ID band is correct and in place/ allergy band as needed  Assess for fall risk  Initiate fall precautions as applicable (fall band, side rails, etc.)  Call light within reach  Bed in low position/ wheels locked    _m___ Pain:       Assess pain level and characteristics  Administer analgesics as ordered  Assess effectiveness of pain management and report to MD as needed    _m___ Knowledge Deficit:  Assess baseline knowledge  Provide teaching at level of understanding  Provide teaching via preferred learning method  Evaluate teaching effectiveness    _m___ Hemodynamic/Respiratory Status:       (Pre and Post Procedure Monitoring)  Assess/Monitor vital signs and LOC  Assess Baseline SpO2 prior to any sedation  Obtain weight/height  Assess vital signs/ LOC until patient meets discharge criteria  Monitor procedure site and notify MD of any issues

## 2023-08-22 LAB
ALBUMIN SERPL-MCNC: 4 G/DL
ALP BLD-CCNC: 63 U/L
ALT SERPL-CCNC: 14 U/L
ANION GAP SERPL CALCULATED.3IONS-SCNC: NORMAL MMOL/L
AST SERPL-CCNC: 13 U/L
BASOPHILS ABSOLUTE: 0 /ΜL
BASOPHILS RELATIVE PERCENT: 1 %
BILIRUB SERPL-MCNC: 0.6 MG/DL (ref 0.1–1.4)
BUN BLDV-MCNC: 14 MG/DL
C-REACTIVE PROTEIN: 1
CALCIUM SERPL-MCNC: 9.2 MG/DL
CHLORIDE BLD-SCNC: 107 MMOL/L
CO2: 22 MMOL/L
CREAT SERPL-MCNC: 0.96 MG/DL
EGFR: 66
EOSINOPHILS ABSOLUTE: 0.3 /ΜL
EOSINOPHILS RELATIVE PERCENT: 6 %
GLUCOSE BLD-MCNC: 98 MG/DL
HCT VFR BLD CALC: 37.9 % (ref 36–46)
HEMOGLOBIN: 12.6 G/DL (ref 12–16)
LYMPHOCYTES ABSOLUTE: 1.6 /ΜL
LYMPHOCYTES RELATIVE PERCENT: 31 %
MCH RBC QN AUTO: 32 PG
MCHC RBC AUTO-ENTMCNC: 33.2 G/DL
MCV RBC AUTO: 96 FL
MONOCYTES ABSOLUTE: 0.6 /ΜL
MONOCYTES RELATIVE PERCENT: 12 %
NEUTROPHILS ABSOLUTE: 2.6 /ΜL
NEUTROPHILS RELATIVE PERCENT: 50 %
PDW BLD-RTO: 12.5 %
PLATELET # BLD: 282 K/ΜL
PMV BLD AUTO: NORMAL FL
POTASSIUM SERPL-SCNC: 4.2 MMOL/L
RBC # BLD: 3.94 10^6/ΜL
SEDIMENTATION RATE, ERYTHROCYTE: 9
SODIUM BLD-SCNC: 143 MMOL/L
TOTAL PROTEIN: 5.8
WBC # BLD: 5.1 10^3/ML

## 2023-08-23 ENCOUNTER — PATIENT MESSAGE (OUTPATIENT)
Dept: RHEUMATOLOGY | Age: 63
End: 2023-08-23

## 2023-08-23 DIAGNOSIS — M05.79 RHEUMATOID ARTHRITIS INVOLVING MULTIPLE SITES WITH POSITIVE RHEUMATOID FACTOR (HCC): ICD-10-CM

## 2023-08-23 DIAGNOSIS — M06.30 RHEUMATOID NODULOSIS (HCC): ICD-10-CM

## 2023-08-29 RX ORDER — MELOXICAM 15 MG/1
TABLET ORAL
Qty: 90 TABLET | Refills: 1 | Status: SHIPPED | OUTPATIENT
Start: 2023-08-29

## 2023-08-31 DIAGNOSIS — M81.0 OSTEOPOROSIS WITHOUT CURRENT PATHOLOGICAL FRACTURE, UNSPECIFIED OSTEOPOROSIS TYPE: ICD-10-CM

## 2023-08-31 RX ORDER — ALENDRONATE SODIUM 70 MG/1
TABLET ORAL
Qty: 4 TABLET | Refills: 1 | Status: SHIPPED | OUTPATIENT
Start: 2023-08-31

## 2023-09-05 ENCOUNTER — OFFICE VISIT (OUTPATIENT)
Dept: RHEUMATOLOGY | Age: 63
End: 2023-09-05
Payer: MEDICARE

## 2023-09-05 VITALS
BODY MASS INDEX: 24.81 KG/M2 | SYSTOLIC BLOOD PRESSURE: 122 MMHG | OXYGEN SATURATION: 98 % | WEIGHT: 131.4 LBS | HEIGHT: 61 IN | DIASTOLIC BLOOD PRESSURE: 70 MMHG | HEART RATE: 87 BPM

## 2023-09-05 DIAGNOSIS — M81.0 OSTEOPOROSIS WITHOUT CURRENT PATHOLOGICAL FRACTURE, UNSPECIFIED OSTEOPOROSIS TYPE: ICD-10-CM

## 2023-09-05 DIAGNOSIS — M05.79 RHEUMATOID ARTHRITIS INVOLVING MULTIPLE SITES WITH POSITIVE RHEUMATOID FACTOR (HCC): Primary | ICD-10-CM

## 2023-09-05 DIAGNOSIS — Z51.81 MEDICATION MONITORING ENCOUNTER: ICD-10-CM

## 2023-09-05 DIAGNOSIS — M06.30 RHEUMATOID NODULOSIS (HCC): ICD-10-CM

## 2023-09-05 PROCEDURE — 1036F TOBACCO NON-USER: CPT | Performed by: INTERNAL MEDICINE

## 2023-09-05 PROCEDURE — 1090F PRES/ABSN URINE INCON ASSESS: CPT | Performed by: INTERNAL MEDICINE

## 2023-09-05 PROCEDURE — 3017F COLORECTAL CA SCREEN DOC REV: CPT | Performed by: INTERNAL MEDICINE

## 2023-09-05 PROCEDURE — G8420 CALC BMI NORM PARAMETERS: HCPCS | Performed by: INTERNAL MEDICINE

## 2023-09-05 PROCEDURE — 99214 OFFICE O/P EST MOD 30 MIN: CPT | Performed by: INTERNAL MEDICINE

## 2023-09-05 PROCEDURE — G8399 PT W/DXA RESULTS DOCUMENT: HCPCS | Performed by: INTERNAL MEDICINE

## 2023-09-05 PROCEDURE — 1123F ACP DISCUSS/DSCN MKR DOCD: CPT | Performed by: INTERNAL MEDICINE

## 2023-09-05 PROCEDURE — G8427 DOCREV CUR MEDS BY ELIG CLIN: HCPCS | Performed by: INTERNAL MEDICINE

## 2023-09-05 RX ORDER — MELOXICAM 15 MG/1
TABLET ORAL
Qty: 90 TABLET | Refills: 1 | Status: SHIPPED | OUTPATIENT
Start: 2023-09-05

## 2023-09-05 RX ORDER — ALENDRONATE SODIUM 70 MG/1
70 TABLET ORAL
Qty: 12 TABLET | Refills: 1 | Status: SHIPPED | OUTPATIENT
Start: 2023-09-05

## 2023-09-05 RX ORDER — FOLIC ACID 1 MG/1
1 TABLET ORAL DAILY
Qty: 180 TABLET | Refills: 0 | Status: SHIPPED | OUTPATIENT
Start: 2023-09-05

## 2023-09-05 ASSESSMENT — ENCOUNTER SYMPTOMS
EYES NEGATIVE: 1
RESPIRATORY NEGATIVE: 1
GASTROINTESTINAL NEGATIVE: 1

## 2023-10-09 ENCOUNTER — HOSPITAL ENCOUNTER (OUTPATIENT)
Dept: NURSING | Age: 63
Discharge: HOME | End: 2023-10-09
Payer: MEDICARE

## 2023-10-09 VITALS
RESPIRATION RATE: 16 BRPM | OXYGEN SATURATION: 94 % | WEIGHT: 132.4 LBS | SYSTOLIC BLOOD PRESSURE: 145 MMHG | TEMPERATURE: 99.3 F | DIASTOLIC BLOOD PRESSURE: 78 MMHG | HEART RATE: 78 BPM | BODY MASS INDEX: 25.03 KG/M2

## 2023-10-09 DIAGNOSIS — M05.79 RHEUMATOID ARTHRITIS INVOLVING MULTIPLE SITES WITH POSITIVE RHEUMATOID FACTOR (HCC): Primary | ICD-10-CM

## 2023-10-09 PROCEDURE — 6370000000 HC RX 637 (ALT 250 FOR IP): Performed by: NURSE PRACTITIONER

## 2023-10-09 PROCEDURE — 2580000003 HC RX 258: Performed by: NURSE PRACTITIONER

## 2023-10-09 PROCEDURE — 96365 THER/PROPH/DIAG IV INF INIT: CPT

## 2023-10-09 PROCEDURE — 6360000002 HC RX W HCPCS: Performed by: NURSE PRACTITIONER

## 2023-10-09 RX ORDER — SODIUM CHLORIDE 9 MG/ML
5-250 INJECTION, SOLUTION INTRAVENOUS PRN
OUTPATIENT
Start: 2023-11-27

## 2023-10-09 RX ORDER — SODIUM CHLORIDE 0.9 % (FLUSH) 0.9 %
5-40 SYRINGE (ML) INJECTION PRN
OUTPATIENT
Start: 2023-11-27

## 2023-10-09 RX ORDER — DIPHENHYDRAMINE HYDROCHLORIDE 50 MG/ML
50 INJECTION INTRAMUSCULAR; INTRAVENOUS
OUTPATIENT
Start: 2023-11-27

## 2023-10-09 RX ORDER — ACETAMINOPHEN 325 MG/1
650 TABLET ORAL ONCE
Status: COMPLETED | OUTPATIENT
Start: 2023-10-09 | End: 2023-10-09

## 2023-10-09 RX ORDER — ALBUTEROL SULFATE 90 UG/1
4 AEROSOL, METERED RESPIRATORY (INHALATION) PRN
OUTPATIENT
Start: 2023-11-27

## 2023-10-09 RX ORDER — ACETAMINOPHEN 325 MG/1
650 TABLET ORAL ONCE
OUTPATIENT
Start: 2023-11-27

## 2023-10-09 RX ORDER — SODIUM CHLORIDE 9 MG/ML
5-250 INJECTION, SOLUTION INTRAVENOUS PRN
Status: DISCONTINUED | OUTPATIENT
Start: 2023-10-09 | End: 2023-10-10 | Stop reason: HOSPADM

## 2023-10-09 RX ORDER — DIPHENHYDRAMINE HCL 25 MG
25 TABLET ORAL ONCE
OUTPATIENT
Start: 2023-11-27 | End: 2023-11-27

## 2023-10-09 RX ORDER — HEPARIN 100 UNIT/ML
500 SYRINGE INTRAVENOUS PRN
OUTPATIENT
Start: 2023-11-27

## 2023-10-09 RX ORDER — EPINEPHRINE 1 MG/ML
0.3 INJECTION, SOLUTION INTRAMUSCULAR; SUBCUTANEOUS PRN
OUTPATIENT
Start: 2023-11-27

## 2023-10-09 RX ORDER — SODIUM CHLORIDE 9 MG/ML
INJECTION, SOLUTION INTRAVENOUS CONTINUOUS
OUTPATIENT
Start: 2023-11-27

## 2023-10-09 RX ORDER — ONDANSETRON 2 MG/ML
8 INJECTION INTRAMUSCULAR; INTRAVENOUS
OUTPATIENT
Start: 2023-11-27

## 2023-10-09 RX ORDER — DIPHENHYDRAMINE HCL 25 MG
25 TABLET ORAL ONCE
Status: COMPLETED | OUTPATIENT
Start: 2023-10-09 | End: 2023-10-09

## 2023-10-09 RX ORDER — ACETAMINOPHEN 325 MG/1
650 TABLET ORAL
OUTPATIENT
Start: 2023-11-27

## 2023-10-09 RX ADMIN — ACETAMINOPHEN 650 MG: 325 TABLET ORAL at 12:28

## 2023-10-09 RX ADMIN — DIPHENHYDRAMINE HYDROCHLORIDE 25 MG: 25 TABLET ORAL at 12:28

## 2023-10-09 RX ADMIN — SODIUM CHLORIDE 20 ML/HR: 9 INJECTION, SOLUTION INTRAVENOUS at 12:28

## 2023-10-09 RX ADMIN — INFLIXIMAB 200 MG: 100 INJECTION, POWDER, LYOPHILIZED, FOR SOLUTION INTRAVENOUS at 12:53

## 2023-10-09 NOTE — PROGRESS NOTES
1130 Patient ambulatory to [facility] for Renflexis infusion. Patient denies any recent infections or antibiotics. Patient verbalizes understanding of infusion. PT RIGHTS AND RESPONSIBILITIES OFFERED TO PT.       1228 Pre meds given to patient. 1253 Renflexis infusion started. 1401 Infusion complete. Patient tolerated well. AVS reviewed with patient. Verbalizes understanding. Patient left ambulatory to discharge lobby.        _M___ Safety:       (Environmental)  [place] to environment  Ensure ID band is correct and in place/ allergy band as needed  Assess for fall risk  Initiate fall precautions as applicable (fall band, side rails, etc.)  Call light within reach  Bed in low position/ wheels locked    _M___ Pain:       Assess pain level and characteristics  Administer analgesics as ordered  Assess effectiveness of pain management and report to MD as needed    _M___ Knowledge Deficit:  Assess baseline knowledge  Provide teaching at level of understanding  Provide teaching via preferred learning method  Evaluate teaching effectiveness    _M___ Hemodynamic/Respiratory Status:       (Pre and Post Procedure Monitoring)  Assess/Monitor vital signs and LOC  Assess Baseline SpO2 prior to any sedation  Obtain weight/height  Assess vital signs/ LOC until patient meets discharge criteria  Monitor procedure site and notify MD of any issues

## 2023-10-09 NOTE — DISCHARGE INSTRUCTIONS
RENFLEXIS DISCHARGE INSTRUCTION SHEET      Can not have an active infection or be on an antibiotic at time of infusion. SIDE EFFECTS: RASH, COUGH, JOINT PAIN, SWELLING TO HANDS/FEET, SORE THROAT, FEVER      Call your doctor or return to the nearest Emergency Room if you start having shortness of breath, chest tightness, wheezing      Next Renflexis infusion is scheduled on: 12/4/2023 @ 11:30am       Please call [phone] with any questions or concerns.

## 2023-11-21 LAB
ALBUMIN SERPL-MCNC: 3.7 G/DL
ALP BLD-CCNC: 56 U/L
ALT SERPL-CCNC: 17 U/L
ANION GAP SERPL CALCULATED.3IONS-SCNC: NORMAL MMOL/L
AST SERPL-CCNC: 17 U/L
BASOPHILS ABSOLUTE: 0 /ΜL
BASOPHILS RELATIVE PERCENT: 1 %
BILIRUB SERPL-MCNC: 0.7 MG/DL (ref 0.1–1.4)
BUN BLDV-MCNC: 13 MG/DL
C-REACTIVE PROTEIN: 1
CALCIUM SERPL-MCNC: 9 MG/DL
CHLORIDE BLD-SCNC: 103 MMOL/L
CO2: 22 MMOL/L
CREAT SERPL-MCNC: 0.54 MG/DL
EGFR: 102
EOSINOPHILS ABSOLUTE: 0.1 /ΜL
EOSINOPHILS RELATIVE PERCENT: 2 %
GLUCOSE BLD-MCNC: 129 MG/DL
HCT VFR BLD CALC: 39.9 % (ref 36–46)
HEMOGLOBIN: 13.6 G/DL (ref 12–16)
LYMPHOCYTES ABSOLUTE: 0.8 /ΜL
LYMPHOCYTES RELATIVE PERCENT: 17 %
MCH RBC QN AUTO: 32.5 PG
MCHC RBC AUTO-ENTMCNC: 34.1 G/DL
MCV RBC AUTO: 96 FL
MONOCYTES ABSOLUTE: 0.4 /ΜL
MONOCYTES RELATIVE PERCENT: 8 %
NEUTROPHILS ABSOLUTE: 3.4 /ΜL
NEUTROPHILS RELATIVE PERCENT: 72 %
PDW BLD-RTO: 12.9 %
PLATELET # BLD: 309 K/ΜL
PMV BLD AUTO: NORMAL FL
POTASSIUM SERPL-SCNC: 4.5 MMOL/L
RBC # BLD: 4.18 10^6/ΜL
SEDIMENTATION RATE, ERYTHROCYTE: 13
SODIUM BLD-SCNC: 139 MMOL/L
TOTAL PROTEIN: 5.8
WBC # BLD: 4.7 10^3/ML

## 2023-11-22 DIAGNOSIS — M06.30 RHEUMATOID NODULOSIS (HCC): ICD-10-CM

## 2023-11-22 DIAGNOSIS — M05.79 RHEUMATOID ARTHRITIS INVOLVING MULTIPLE SITES WITH POSITIVE RHEUMATOID FACTOR (HCC): ICD-10-CM

## 2023-11-22 RX ORDER — METHOTREXATE 2.5 MG/1
TABLET ORAL
Qty: 16 TABLET | Refills: 0 | OUTPATIENT
Start: 2023-11-22

## 2023-11-24 DIAGNOSIS — M05.79 RHEUMATOID ARTHRITIS INVOLVING MULTIPLE SITES WITH POSITIVE RHEUMATOID FACTOR (HCC): ICD-10-CM

## 2023-11-24 DIAGNOSIS — M06.30 RHEUMATOID NODULOSIS (HCC): ICD-10-CM

## 2023-11-24 RX ORDER — METHOTREXATE 2.5 MG/1
TABLET ORAL
Qty: 16 TABLET | Refills: 0 | Status: SHIPPED | OUTPATIENT
Start: 2023-11-24

## 2023-12-04 ENCOUNTER — HOSPITAL ENCOUNTER (OUTPATIENT)
Dept: NURSING | Age: 63
Discharge: HOME | End: 2023-12-04
Payer: MEDICARE

## 2023-12-04 VITALS
OXYGEN SATURATION: 96 % | DIASTOLIC BLOOD PRESSURE: 61 MMHG | WEIGHT: 133 LBS | TEMPERATURE: 98.9 F | HEART RATE: 100 BPM | RESPIRATION RATE: 18 BRPM | SYSTOLIC BLOOD PRESSURE: 110 MMHG | BODY MASS INDEX: 25.14 KG/M2

## 2023-12-04 DIAGNOSIS — M05.79 RHEUMATOID ARTHRITIS INVOLVING MULTIPLE SITES WITH POSITIVE RHEUMATOID FACTOR (HCC): Primary | ICD-10-CM

## 2023-12-04 PROCEDURE — 96365 THER/PROPH/DIAG IV INF INIT: CPT

## 2023-12-04 PROCEDURE — 6360000002 HC RX W HCPCS: Performed by: NURSE PRACTITIONER

## 2023-12-04 PROCEDURE — 2580000003 HC RX 258: Performed by: NURSE PRACTITIONER

## 2023-12-04 PROCEDURE — 6370000000 HC RX 637 (ALT 250 FOR IP): Performed by: NURSE PRACTITIONER

## 2023-12-04 RX ORDER — DIPHENHYDRAMINE HCL 25 MG
25 TABLET ORAL ONCE
OUTPATIENT
Start: 2024-01-29 | End: 2024-01-29

## 2023-12-04 RX ORDER — DIPHENHYDRAMINE HCL 25 MG
25 TABLET ORAL ONCE
Status: COMPLETED | OUTPATIENT
Start: 2023-12-04 | End: 2023-12-04

## 2023-12-04 RX ORDER — ALBUTEROL SULFATE 90 UG/1
4 AEROSOL, METERED RESPIRATORY (INHALATION) PRN
OUTPATIENT
Start: 2024-01-29

## 2023-12-04 RX ORDER — SODIUM CHLORIDE 9 MG/ML
5-250 INJECTION, SOLUTION INTRAVENOUS PRN
OUTPATIENT
Start: 2024-01-29

## 2023-12-04 RX ORDER — DIPHENHYDRAMINE HYDROCHLORIDE 50 MG/ML
50 INJECTION INTRAMUSCULAR; INTRAVENOUS
OUTPATIENT
Start: 2024-01-29

## 2023-12-04 RX ORDER — ACETAMINOPHEN 325 MG/1
650 TABLET ORAL ONCE
Status: COMPLETED | OUTPATIENT
Start: 2023-12-04 | End: 2023-12-04

## 2023-12-04 RX ORDER — ONDANSETRON 2 MG/ML
8 INJECTION INTRAMUSCULAR; INTRAVENOUS
OUTPATIENT
Start: 2024-01-29

## 2023-12-04 RX ORDER — SODIUM CHLORIDE 0.9 % (FLUSH) 0.9 %
5-40 SYRINGE (ML) INJECTION PRN
OUTPATIENT
Start: 2024-01-29

## 2023-12-04 RX ORDER — SODIUM CHLORIDE 9 MG/ML
INJECTION, SOLUTION INTRAVENOUS CONTINUOUS
OUTPATIENT
Start: 2024-01-29

## 2023-12-04 RX ORDER — HEPARIN 100 UNIT/ML
500 SYRINGE INTRAVENOUS PRN
OUTPATIENT
Start: 2024-01-29

## 2023-12-04 RX ORDER — ACETAMINOPHEN 325 MG/1
650 TABLET ORAL
OUTPATIENT
Start: 2024-01-29

## 2023-12-04 RX ORDER — ACETAMINOPHEN 325 MG/1
650 TABLET ORAL ONCE
OUTPATIENT
Start: 2024-01-29

## 2023-12-04 RX ORDER — EPINEPHRINE 1 MG/ML
0.3 INJECTION, SOLUTION INTRAMUSCULAR; SUBCUTANEOUS PRN
OUTPATIENT
Start: 2024-01-29

## 2023-12-04 RX ORDER — SODIUM CHLORIDE 9 MG/ML
5-250 INJECTION, SOLUTION INTRAVENOUS PRN
Status: DISCONTINUED | OUTPATIENT
Start: 2023-12-04 | End: 2023-12-05 | Stop reason: HOSPADM

## 2023-12-04 RX ADMIN — DIPHENHYDRAMINE HYDROCHLORIDE 25 MG: 25 TABLET ORAL at 11:42

## 2023-12-04 RX ADMIN — SODIUM CHLORIDE 20 ML/HR: 9 INJECTION, SOLUTION INTRAVENOUS at 11:47

## 2023-12-04 RX ADMIN — ACETAMINOPHEN 650 MG: 325 TABLET ORAL at 11:42

## 2023-12-04 RX ADMIN — INFLIXIMAB 200 MG: 100 INJECTION, POWDER, LYOPHILIZED, FOR SOLUTION INTRAVENOUS at 12:24

## 2023-12-04 NOTE — PROGRESS NOTES
[address] ambulated into room for renflexis infusion. Pt rights and responsibilities offered to her. She states no infections nor atbs today. Tylenol and benadryl given. Zacarias Macedo was offered drink and snack. No other needs at this time call light within reach. 1224 renflexis infusion started and Zacarias Macedo has no other needs at this time. 1335  infusion complete and Sapna tolerated well. D/c instructions explained and Zacarias Macedo verbalized understanding. Zacarias Macedo ambulated out per self for d/c today.                        __m__ Safety:       (Environmental)  [place] to environment  Ensure ID band is correct and in place/ allergy band as needed  Assess for fall risk  Initiate fall precautions as applicable (fall band, side rails, etc.)  Call light within reach  Bed in low position/ wheels locked    _m___ Pain:       Assess pain level and characteristics  Administer analgesics as ordered  Assess effectiveness of pain management and report to MD as needed    _m___ Knowledge Deficit:  Assess baseline knowledge  Provide teaching at level of understanding  Provide teaching via preferred learning method  Evaluate teaching effectiveness    _m___ Hemodynamic/Respiratory Status:       (Pre and Post Procedure Monitoring)  Assess/Monitor vital signs and LOC  Assess Baseline SpO2 prior to any sedation  Obtain weight/height  Assess vital signs/ LOC until patient meets discharge criteria  Monitor procedure site and notify MD of any issues

## 2023-12-04 NOTE — DISCHARGE INSTRUCTIONS
RENFLEXIS DISCHARGE INSTRUCTION SHEET      Can not have an active infection or be on an antibiotic at time of infusion. SIDE EFFECTS: RASH, COUGH, JOINT PAIN, SWELLING TO HANDS/FEET, SORE THROAT, FEVER      Call your doctor or return to the nearest Emergency Room if you start having shortness of breath, chest tightness, wheezing            Please call [phone] with any questions or concerns.

## 2023-12-21 DIAGNOSIS — M05.79 RHEUMATOID ARTHRITIS INVOLVING MULTIPLE SITES WITH POSITIVE RHEUMATOID FACTOR (HCC): ICD-10-CM

## 2023-12-21 DIAGNOSIS — M06.30 RHEUMATOID NODULOSIS (HCC): ICD-10-CM

## 2023-12-21 RX ORDER — METHOTREXATE 2.5 MG/1
TABLET ORAL
Qty: 48 TABLET | Refills: 0 | Status: SHIPPED | OUTPATIENT
Start: 2023-12-21

## 2023-12-21 NOTE — TELEPHONE ENCOUNTER
DOLV: 09/05/23  DONV: 03/05/24  LAST LAB DRAW: 11/21/23  LAST TB TEST: na    Lab Results   Component Value Date     11/21/2023    K 4.5 11/21/2023     11/21/2023    CO2 22 11/21/2023    BUN 13 11/21/2023    CREATININE 0.54 11/21/2023    GLUCOSE 129 11/21/2023    CALCIUM 9.0 11/21/2023    PROT 6.9 05/10/2021    LABALBU 3.7 11/21/2023    BILITOT 0.7 11/21/2023    ALKPHOS 56 11/21/2023    AST 17 11/21/2023    ALT 17 11/21/2023    LABGLOM 102 11/21/2023       No results for input(s): \"WBC\", \"HGB\", \"HCT\", \"MCV\", \"PLT\" in the last 720 hours.    Lab Results   Component Value Date    SEDRATE 13 11/21/2023       Lab Results   Component Value Date    CRP 1 11/21/2023

## 2024-01-31 ENCOUNTER — HOSPITAL ENCOUNTER (OUTPATIENT)
Dept: NURSING | Age: 64
Discharge: HOME | End: 2024-01-31
Payer: MEDICARE

## 2024-01-31 VITALS
RESPIRATION RATE: 16 BRPM | BODY MASS INDEX: 25.14 KG/M2 | DIASTOLIC BLOOD PRESSURE: 58 MMHG | WEIGHT: 133 LBS | TEMPERATURE: 97 F | SYSTOLIC BLOOD PRESSURE: 119 MMHG | HEART RATE: 85 BPM | OXYGEN SATURATION: 98 %

## 2024-01-31 DIAGNOSIS — M05.79 RHEUMATOID ARTHRITIS INVOLVING MULTIPLE SITES WITH POSITIVE RHEUMATOID FACTOR (HCC): Primary | ICD-10-CM

## 2024-01-31 PROCEDURE — 6360000002 HC RX W HCPCS: Performed by: NURSE PRACTITIONER

## 2024-01-31 PROCEDURE — 96365 THER/PROPH/DIAG IV INF INIT: CPT

## 2024-01-31 PROCEDURE — 2580000003 HC RX 258: Performed by: NURSE PRACTITIONER

## 2024-01-31 PROCEDURE — 6370000000 HC RX 637 (ALT 250 FOR IP): Performed by: NURSE PRACTITIONER

## 2024-01-31 RX ORDER — ALBUTEROL SULFATE 90 UG/1
4 AEROSOL, METERED RESPIRATORY (INHALATION) PRN
OUTPATIENT
Start: 2024-03-27

## 2024-01-31 RX ORDER — ACETAMINOPHEN 325 MG/1
650 TABLET ORAL ONCE
Status: COMPLETED | OUTPATIENT
Start: 2024-01-31 | End: 2024-01-31

## 2024-01-31 RX ORDER — SODIUM CHLORIDE 9 MG/ML
5-250 INJECTION, SOLUTION INTRAVENOUS PRN
OUTPATIENT
Start: 2024-03-27

## 2024-01-31 RX ORDER — SODIUM CHLORIDE 0.9 % (FLUSH) 0.9 %
5-40 SYRINGE (ML) INJECTION PRN
OUTPATIENT
Start: 2024-03-27

## 2024-01-31 RX ORDER — ONDANSETRON 2 MG/ML
8 INJECTION INTRAMUSCULAR; INTRAVENOUS
OUTPATIENT
Start: 2024-03-27

## 2024-01-31 RX ORDER — DIPHENHYDRAMINE HCL 25 MG
25 TABLET ORAL ONCE
Status: COMPLETED | OUTPATIENT
Start: 2024-01-31 | End: 2024-01-31

## 2024-01-31 RX ORDER — DIPHENHYDRAMINE HYDROCHLORIDE 50 MG/ML
50 INJECTION INTRAMUSCULAR; INTRAVENOUS
OUTPATIENT
Start: 2024-03-27

## 2024-01-31 RX ORDER — EPINEPHRINE 1 MG/ML
0.3 INJECTION, SOLUTION INTRAMUSCULAR; SUBCUTANEOUS PRN
OUTPATIENT
Start: 2024-03-27

## 2024-01-31 RX ORDER — DIPHENHYDRAMINE HCL 25 MG
25 TABLET ORAL ONCE
OUTPATIENT
Start: 2024-03-27 | End: 2024-03-27

## 2024-01-31 RX ORDER — SODIUM CHLORIDE 9 MG/ML
INJECTION, SOLUTION INTRAVENOUS CONTINUOUS
OUTPATIENT
Start: 2024-03-27

## 2024-01-31 RX ORDER — ACETAMINOPHEN 325 MG/1
650 TABLET ORAL
OUTPATIENT
Start: 2024-03-27

## 2024-01-31 RX ORDER — SODIUM CHLORIDE 9 MG/ML
5-250 INJECTION, SOLUTION INTRAVENOUS PRN
Status: DISCONTINUED | OUTPATIENT
Start: 2024-01-31 | End: 2024-02-01 | Stop reason: HOSPADM

## 2024-01-31 RX ORDER — ACETAMINOPHEN 325 MG/1
650 TABLET ORAL ONCE
OUTPATIENT
Start: 2024-03-27

## 2024-01-31 RX ORDER — HEPARIN 100 UNIT/ML
500 SYRINGE INTRAVENOUS PRN
OUTPATIENT
Start: 2024-03-27

## 2024-01-31 RX ADMIN — ACETAMINOPHEN 650 MG: 325 TABLET ORAL at 11:42

## 2024-01-31 RX ADMIN — DIPHENHYDRAMINE HYDROCHLORIDE 25 MG: 25 TABLET ORAL at 11:42

## 2024-01-31 RX ADMIN — INFLIXIMAB 200 MG: 100 INJECTION, POWDER, LYOPHILIZED, FOR SOLUTION INTRAVENOUS at 12:57

## 2024-01-31 RX ADMIN — SODIUM CHLORIDE 20 ML/HR: 9 INJECTION, SOLUTION INTRAVENOUS at 11:29

## 2024-01-31 NOTE — PROGRESS NOTES
1115:Patient arrived ambulatory for Renflexis infusion. Patient rights and responsibilities offered to patient.  Patient denies any antibiotic usage or infection at this time.      1142: Pre-medications administered. Patient tolerated well.     1257: Renflexis infusion started. Patient tolerating well.      1400: Infusion complete, patient tolerated well. No concerns voiced.   AVS reviewed with patient, voiced understanding. Patient discharged ambulatory.                   _m___ Safety:       (Environmental)  [place] to environment  Ensure ID band is correct and in place/ allergy band as needed  Assess for fall risk  Initiate fall precautions as applicable (fall band, side rails, etc.)  Call light within reach  Bed in low position/ wheels locked    _m___ Pain:       Assess pain level and characteristics  Administer analgesics as ordered  Assess effectiveness of pain management and report to MD as needed    _m___ Knowledge Deficit:  Assess baseline knowledge  Provide teaching at level of understanding  Provide teaching via preferred learning method  Evaluate teaching effectiveness    _m___ Hemodynamic/Respiratory Status:       (Pre and Post Procedure Monitoring)  Assess/Monitor vital signs and LOC  Assess Baseline SpO2 prior to any sedation  Obtain weight/height  Assess vital signs/ LOC until patient meets discharge criteria  Monitor procedure site and notify MD of any issues

## 2024-01-31 NOTE — DISCHARGE INSTRUCTIONS
RENFLEXIS DISCHARGE INSTRUCTION SHEET      Can not have an active infection or be on an antibiotic at time of infusion.       SIDE EFFECTS: RASH, COUGH, JOINT PAIN, SWELLING TO HANDS/FEET, SORE THROAT, FEVER      Call your doctor or return to the nearest Emergency Room if you start having shortness of breath, chest tightness, wheezing      Next Renflexis infusion is scheduled on: March 27th at 11:30 AM      Please call [phone] with any questions or concerns.

## 2024-02-24 LAB
ALBUMIN SERPL-MCNC: 4.1 G/DL
ALT SERPL-CCNC: 16 U/L
ANION GAP SERPL CALCULATED.3IONS-SCNC: NORMAL MMOL/L
AST SERPL-CCNC: 19 U/L
BASOPHILS ABSOLUTE: 0.1 /ΜL
BASOPHILS RELATIVE PERCENT: 1 %
BILIRUB SERPL-MCNC: 0.8 MG/DL (ref 0.1–1.4)
BUN BLDV-MCNC: 11 MG/DL
C-REACTIVE PROTEIN: 1
CALCIUM SERPL-MCNC: 9.1 MG/DL
CHLORIDE BLD-SCNC: 105 MMOL/L
CO2: 21 MMOL/L
CREAT SERPL-MCNC: 0.64 MG/DL
EGFR: 98
EOSINOPHILS ABSOLUTE: 0.5 /ΜL
EOSINOPHILS RELATIVE PERCENT: 10 %
GLUCOSE BLD-MCNC: 91 MG/DL
HCT VFR BLD CALC: 40 % (ref 36–46)
HEMOGLOBIN: 13.2 G/DL (ref 12–16)
LYMPHOCYTES ABSOLUTE: 1.7 /ΜL
LYMPHOCYTES RELATIVE PERCENT: 37 %
MCH RBC QN AUTO: 32 PG
MCHC RBC AUTO-ENTMCNC: 33 G/DL
MCV RBC AUTO: 97 FL
MONOCYTES ABSOLUTE: 0.4 /ΜL
MONOCYTES RELATIVE PERCENT: 9 %
NEUTROPHILS ABSOLUTE: 2 /ΜL
NEUTROPHILS RELATIVE PERCENT: 43 %
PDW BLD-RTO: 12.4 %
PLATELET # BLD: 296 K/ΜL
PMV BLD AUTO: NORMAL FL
POTASSIUM SERPL-SCNC: 4.5 MMOL/L
RBC # BLD: 4.12 10^6/ΜL
SEDIMENTATION RATE, ERYTHROCYTE: 28
SODIUM BLD-SCNC: 141 MMOL/L
TOTAL PROTEIN: 6.4
WBC # BLD: 4.6 10^3/ML

## 2024-02-25 DIAGNOSIS — M81.0 OSTEOPOROSIS WITHOUT CURRENT PATHOLOGICAL FRACTURE, UNSPECIFIED OSTEOPOROSIS TYPE: ICD-10-CM

## 2024-02-26 DIAGNOSIS — M05.79 RHEUMATOID ARTHRITIS INVOLVING MULTIPLE SITES WITH POSITIVE RHEUMATOID FACTOR (HCC): ICD-10-CM

## 2024-02-26 DIAGNOSIS — M06.30 RHEUMATOID NODULOSIS (HCC): ICD-10-CM

## 2024-02-26 RX ORDER — METHOTREXATE 2.5 MG/1
TABLET ORAL
Qty: 48 TABLET | Refills: 0 | Status: SHIPPED | OUTPATIENT
Start: 2024-02-26

## 2024-02-26 RX ORDER — ALENDRONATE SODIUM 70 MG/1
TABLET ORAL
Qty: 12 TABLET | Refills: 1 | Status: SHIPPED | OUTPATIENT
Start: 2024-02-26

## 2024-02-26 RX ORDER — FOLIC ACID 1 MG/1
1 TABLET ORAL DAILY
Qty: 180 TABLET | Refills: 0 | Status: SHIPPED | OUTPATIENT
Start: 2024-02-26

## 2024-03-05 ENCOUNTER — OFFICE VISIT (OUTPATIENT)
Dept: RHEUMATOLOGY | Age: 64
End: 2024-03-05
Payer: MEDICARE

## 2024-03-05 VITALS
DIASTOLIC BLOOD PRESSURE: 60 MMHG | SYSTOLIC BLOOD PRESSURE: 126 MMHG | HEART RATE: 102 BPM | WEIGHT: 134.4 LBS | HEIGHT: 61 IN | BODY MASS INDEX: 25.37 KG/M2 | OXYGEN SATURATION: 96 %

## 2024-03-05 DIAGNOSIS — M06.30 RHEUMATOID NODULOSIS (HCC): ICD-10-CM

## 2024-03-05 DIAGNOSIS — M81.0 OSTEOPOROSIS WITHOUT CURRENT PATHOLOGICAL FRACTURE, UNSPECIFIED OSTEOPOROSIS TYPE: ICD-10-CM

## 2024-03-05 DIAGNOSIS — Z51.81 MEDICATION MONITORING ENCOUNTER: ICD-10-CM

## 2024-03-05 DIAGNOSIS — M17.0 OSTEOARTHRITIS OF BOTH KNEES, UNSPECIFIED OSTEOARTHRITIS TYPE: ICD-10-CM

## 2024-03-05 DIAGNOSIS — M05.79 RHEUMATOID ARTHRITIS INVOLVING MULTIPLE SITES WITH POSITIVE RHEUMATOID FACTOR (HCC): Primary | ICD-10-CM

## 2024-03-05 PROCEDURE — 1090F PRES/ABSN URINE INCON ASSESS: CPT | Performed by: INTERNAL MEDICINE

## 2024-03-05 PROCEDURE — G2211 COMPLEX E/M VISIT ADD ON: HCPCS | Performed by: INTERNAL MEDICINE

## 2024-03-05 PROCEDURE — 99214 OFFICE O/P EST MOD 30 MIN: CPT | Performed by: INTERNAL MEDICINE

## 2024-03-05 PROCEDURE — G8419 CALC BMI OUT NRM PARAM NOF/U: HCPCS | Performed by: INTERNAL MEDICINE

## 2024-03-05 PROCEDURE — G8427 DOCREV CUR MEDS BY ELIG CLIN: HCPCS | Performed by: INTERNAL MEDICINE

## 2024-03-05 PROCEDURE — G8399 PT W/DXA RESULTS DOCUMENT: HCPCS | Performed by: INTERNAL MEDICINE

## 2024-03-05 PROCEDURE — 3017F COLORECTAL CA SCREEN DOC REV: CPT | Performed by: INTERNAL MEDICINE

## 2024-03-05 PROCEDURE — 1123F ACP DISCUSS/DSCN MKR DOCD: CPT | Performed by: INTERNAL MEDICINE

## 2024-03-05 PROCEDURE — 1036F TOBACCO NON-USER: CPT | Performed by: INTERNAL MEDICINE

## 2024-03-05 PROCEDURE — G8484 FLU IMMUNIZE NO ADMIN: HCPCS | Performed by: INTERNAL MEDICINE

## 2024-03-05 ASSESSMENT — ENCOUNTER SYMPTOMS
RESPIRATORY NEGATIVE: 1
EYES NEGATIVE: 1
GASTROINTESTINAL NEGATIVE: 1

## 2024-03-05 NOTE — PROGRESS NOTES
[facility] RHEUMATOLOGY FOLLOW UP NOTE       Date Of Service: 3/5/2024  Provider: LASHELL GARCIA DO    Name: Sapna Millard   MRN: [id number]    Chief Complaint(s)     Chief Complaint   Patient presents with    Follow-up     6 month follow up RA        History of Present Illness (HPI)     Sapna Millard  is a(n)64 y.o. female with a hx of osteoarthritis bilateral knees, rheumatoid arthritis, left hand knuckle replacement, left elbow nodule removal (2010), nodule removals bilateral feet (2005) here for the f/u evaluation of rheumatoid arthritis.         Rheumatoid arthritis / osteoarthritis     -- no active nodules , decreased size on elbows   -- RA flares or significant joint pains.   -- scheduled for left TKR may 6th, 2024.       osteoporosis   - DEXA nov.2022 w/ T-score of -3.3 in the lower back.    stable, no falls, weakness, reported alendronate complaince. denies, jaw or thigh pain           REVIEW OF SYSTEMS: (ROS)    Review of Systems   Constitutional: Negative.    HENT: Negative.     Eyes: Negative.    Respiratory: Negative.     Cardiovascular: Negative.    Gastrointestinal: Negative.    Endocrine: Negative.    Genitourinary: Negative.    Skin: Negative.    Neurological: Negative.    Hematological: Negative.        PmHx:  has a past medical history of High cholesterol, Hypertension, and RA (rheumatoid arthritis) ([facility]).     Social History:  reports that she has never smoked. She has never used smokeless tobacco. She reports that she does not drink alcohol and does not use drugs.    No Known Allergies    CURRENT MEDICATIONS      Current Outpatient Medications:     alendronate (FOSAMAX) 70 MG tablet, TAKE 1 TABLET BY MOUTH ONE TIME PER WEEK, Disp: 12 tablet, Rfl: 1    folic acid (FOLVITE) 1 MG tablet, Take 1 tablet by mouth daily, Disp: 180 tablet, Rfl: 0    methotrexate (RHEUMATREX) 2.5 MG chemo tablet, TAKE 4 TABLETS BY MOUTH ONE TIME PER WEEK, Disp: 48 tablet, Rfl: 0    meloxicam (MOBIC) 15 MG tablet,

## 2024-03-13 ENCOUNTER — TELEPHONE (OUTPATIENT)
Dept: RHEUMATOLOGY | Age: 64
End: 2024-03-13

## 2024-03-13 NOTE — TELEPHONE ENCOUNTER
Delaney with [facility] office called stating they received last office note. Delaney stated she did not see in the note where patient was cleared for surgery. Delaney is asking if patient is cleared for surgery from Rheumatology standpoint. If so, Delaney is requesting note stating patient is cleared for surgery.     I see where your last note stated: ideal timing for surgery would be at week 9 after the last infliximab dosing, will obtain baseline x-ray cervical spine to evaluate for cervical instability. Methotrexate can be continued perioperatively     It looks like her last Infliximab infusion was on 1/31/24. Patient next scheduled for Infliximab infusion on 3/27/24 Please advise. Thank you.     Fax [phone]

## 2024-03-13 NOTE — TELEPHONE ENCOUNTER
A letter has been written. I am unable to provide clearance a surgery. WE can provide risk stratification and medication recommendations  timing , along with note that the patient is stable from a rheumatology perspective for surgery

## 2024-03-27 ENCOUNTER — HOSPITAL ENCOUNTER (OUTPATIENT)
Dept: NURSING | Age: 64
Discharge: HOME | End: 2024-03-27
Payer: MEDICARE

## 2024-03-27 VITALS
RESPIRATION RATE: 16 BRPM | DIASTOLIC BLOOD PRESSURE: 59 MMHG | TEMPERATURE: 96.4 F | OXYGEN SATURATION: 98 % | BODY MASS INDEX: 24.58 KG/M2 | SYSTOLIC BLOOD PRESSURE: 122 MMHG | WEIGHT: 130 LBS | HEART RATE: 78 BPM

## 2024-03-27 DIAGNOSIS — M05.79 RHEUMATOID ARTHRITIS INVOLVING MULTIPLE SITES WITH POSITIVE RHEUMATOID FACTOR (HCC): Primary | ICD-10-CM

## 2024-03-27 PROCEDURE — 6360000002 HC RX W HCPCS: Performed by: INTERNAL MEDICINE

## 2024-03-27 PROCEDURE — 2580000003 HC RX 258: Performed by: INTERNAL MEDICINE

## 2024-03-27 PROCEDURE — 96365 THER/PROPH/DIAG IV INF INIT: CPT

## 2024-03-27 PROCEDURE — 6370000000 HC RX 637 (ALT 250 FOR IP): Performed by: INTERNAL MEDICINE

## 2024-03-27 RX ORDER — HEPARIN 100 UNIT/ML
500 SYRINGE INTRAVENOUS PRN
OUTPATIENT
Start: 2024-05-22

## 2024-03-27 RX ORDER — ALBUTEROL SULFATE 90 UG/1
4 AEROSOL, METERED RESPIRATORY (INHALATION) PRN
OUTPATIENT
Start: 2024-05-22

## 2024-03-27 RX ORDER — DIPHENHYDRAMINE HYDROCHLORIDE 50 MG/ML
50 INJECTION INTRAMUSCULAR; INTRAVENOUS
OUTPATIENT
Start: 2024-05-22

## 2024-03-27 RX ORDER — ACETAMINOPHEN 325 MG/1
650 TABLET ORAL ONCE
Status: COMPLETED | OUTPATIENT
Start: 2024-03-27 | End: 2024-03-27

## 2024-03-27 RX ORDER — SODIUM CHLORIDE 0.9 % (FLUSH) 0.9 %
5-40 SYRINGE (ML) INJECTION PRN
OUTPATIENT
Start: 2024-05-22

## 2024-03-27 RX ORDER — SODIUM CHLORIDE 9 MG/ML
INJECTION, SOLUTION INTRAVENOUS CONTINUOUS
OUTPATIENT
Start: 2024-05-22

## 2024-03-27 RX ORDER — EPINEPHRINE 1 MG/ML
0.3 INJECTION, SOLUTION INTRAMUSCULAR; SUBCUTANEOUS PRN
OUTPATIENT
Start: 2024-05-22

## 2024-03-27 RX ORDER — ACETAMINOPHEN 325 MG/1
650 TABLET ORAL
OUTPATIENT
Start: 2024-05-22

## 2024-03-27 RX ORDER — DIPHENHYDRAMINE HCL 25 MG
25 TABLET ORAL ONCE
OUTPATIENT
Start: 2024-05-22 | End: 2024-05-22

## 2024-03-27 RX ORDER — DIPHENHYDRAMINE HCL 25 MG
25 TABLET ORAL ONCE
Status: COMPLETED | OUTPATIENT
Start: 2024-03-27 | End: 2024-03-27

## 2024-03-27 RX ORDER — SODIUM CHLORIDE 9 MG/ML
5-250 INJECTION, SOLUTION INTRAVENOUS PRN
OUTPATIENT
Start: 2024-05-22

## 2024-03-27 RX ORDER — ONDANSETRON 2 MG/ML
8 INJECTION INTRAMUSCULAR; INTRAVENOUS
OUTPATIENT
Start: 2024-05-22

## 2024-03-27 RX ORDER — ACETAMINOPHEN 325 MG/1
650 TABLET ORAL ONCE
OUTPATIENT
Start: 2024-05-22

## 2024-03-27 RX ORDER — SODIUM CHLORIDE 9 MG/ML
5-250 INJECTION, SOLUTION INTRAVENOUS PRN
Status: DISCONTINUED | OUTPATIENT
Start: 2024-03-27 | End: 2024-03-28 | Stop reason: HOSPADM

## 2024-03-27 RX ADMIN — SODIUM CHLORIDE 20 ML/HR: 9 INJECTION, SOLUTION INTRAVENOUS at 11:25

## 2024-03-27 RX ADMIN — INFLIXIMAB 200 MG: 100 INJECTION, POWDER, LYOPHILIZED, FOR SOLUTION INTRAVENOUS at 12:09

## 2024-03-27 RX ADMIN — ACETAMINOPHEN 650 MG: 325 TABLET ORAL at 11:25

## 2024-03-27 RX ADMIN — DIPHENHYDRAMINE HYDROCHLORIDE 25 MG: 25 TABLET ORAL at 11:25

## 2024-03-27 ASSESSMENT — PAIN - FUNCTIONAL ASSESSMENT: PAIN_FUNCTIONAL_ASSESSMENT: NONE - DENIES PAIN

## 2024-03-27 NOTE — PROGRESS NOTES
1112 Patient arrived to [facility] ambulatory for renflexis infusion.  Oriented to room and call light  PT RIGHTS AND RESPONSIBILITIES OFFERED TO PT.  She denies recent infection or antibiotic use     1125 Pre-medications given and she denies complaints     1209 Medication started and she denies complaints    1311 Medication completed and she denies complaints.  Iv removed.  Discharge instructions given and explained and she denies questions.  Discharged ambulatory     __M__ Safety:       (Environmental)  [place] to environment  Ensure ID band is correct and in place/ allergy band as needed  Assess for fall risk  Initiate fall precautions as applicable (fall band, side rails, etc.)  Call light within reach  Bed in low position/ wheels locked    _M___ Pain:       Assess pain level and characteristics  Administer analgesics as ordered  Assess effectiveness of pain management and report to MD as needed    _M___ Knowledge Deficit:  Assess baseline knowledge  Provide teaching at level of understanding  Provide teaching via preferred learning method  Evaluate teaching effectiveness    M____ Hemodynamic/Respiratory Status:       (Pre and Post Procedure Monitoring)  Assess/Monitor vital signs and LOC  Assess Baseline SpO2 prior to any sedation  Obtain weight/height  Assess vital signs/ LOC until patient meets discharge criteria  Monitor procedure site and notify MD of any issues

## 2024-03-27 NOTE — DISCHARGE INSTRUCTIONS
Next appointment is scheduled for June 11 at 11:30    RENFLEXIS DISCHARGE INSTRUCTION SHEET      Can not have an active infection or be on an antibiotic at time of infusion.       SIDE EFFECTS: RASH, COUGH, JOINT PAIN, SWELLING TO HANDS/FEET, SORE THROAT, FEVER      Call your doctor or return to the nearest Emergency Room if you start having shortness of breath, chest tightness, wheezing      Please call [phone] with any questions or concerns.

## 2024-04-09 ENCOUNTER — TELEPHONE (OUTPATIENT)
Dept: RHEUMATOLOGY | Age: 64
End: 2024-04-09

## 2024-04-09 NOTE — TELEPHONE ENCOUNTER
Spouse on HIPAA, called stating patient is scheduled for left knee replacement on 5/6/24. Patient had her last Renflexis infusion on 3/27/24. Spouse stated they were unaware that patient was to hold the Renflexis infusion if the surgery was scheduled before the 9 week wait period from last Renflexis infusion. Spouse is upset that patient has to wait 9 weeks after Renflexis prior to having the surgery. Spouse is stated he was told at last visit that patient only had to wait 6 weeks after Renflexis infusion prior to surgery. Spouse is asking if patient can proceed with surgery on 5/6/24 which would be about 6 weeks after her last Renflexis infusion per spouse. Spouse is also asking if patient is ok to proceed with the surgery as scheduled, if there a huge risk if she has the surgery only 6 weeks after the Renflexis. Please advise. Thank you.       Patient completed c spine xray. Results in chart.

## 2024-05-18 DIAGNOSIS — M06.30 RHEUMATOID NODULOSIS (HCC): ICD-10-CM

## 2024-05-18 DIAGNOSIS — M05.79 RHEUMATOID ARTHRITIS INVOLVING MULTIPLE SITES WITH POSITIVE RHEUMATOID FACTOR (HCC): ICD-10-CM

## 2024-05-20 RX ORDER — MELOXICAM 15 MG/1
TABLET ORAL
Qty: 90 TABLET | Refills: 1 | Status: SHIPPED | OUTPATIENT
Start: 2024-05-20

## 2024-05-20 RX ORDER — METHOTREXATE 2.5 MG/1
TABLET ORAL
Qty: 48 TABLET | Refills: 0 | OUTPATIENT
Start: 2024-05-20

## 2024-05-31 LAB
ALBUMIN SERPL-MCNC: 3.9 G/DL
ALP BLD-CCNC: 72 U/L
ALT SERPL-CCNC: 15 U/L
ANION GAP SERPL CALCULATED.3IONS-SCNC: NORMAL MMOL/L
AST SERPL-CCNC: 16 U/L
BASOPHILS ABSOLUTE: 0.1 /ΜL
BASOPHILS RELATIVE PERCENT: 1 %
BILIRUB SERPL-MCNC: 0.6 MG/DL (ref 0.1–1.4)
BUN BLDV-MCNC: 16 MG/DL
CALCIUM SERPL-MCNC: 9.1 MG/DL
CHLORIDE BLD-SCNC: 106 MMOL/L
CO2: 22 MMOL/L
CREAT SERPL-MCNC: 0.72 MG/DL
EGFR: 92
EOSINOPHILS ABSOLUTE: 0.3 /ΜL
EOSINOPHILS RELATIVE PERCENT: 5 %
GLUCOSE BLD-MCNC: 95 MG/DL
HCT VFR BLD CALC: 39 % (ref 36–46)
HEMOGLOBIN: 13.2 G/DL (ref 12–16)
LYMPHOCYTES ABSOLUTE: 1.4 /ΜL
LYMPHOCYTES RELATIVE PERCENT: 22 %
MCH RBC QN AUTO: 32.5 PG
MCHC RBC AUTO-ENTMCNC: 33.8 G/DL
MCV RBC AUTO: 96 FL
MONOCYTES ABSOLUTE: 0.6 /ΜL
MONOCYTES RELATIVE PERCENT: 10 %
NEUTROPHILS ABSOLUTE: 4.1 /ΜL
NEUTROPHILS RELATIVE PERCENT: 62 %
PDW BLD-RTO: 12.9 %
PLATELET # BLD: 319 K/ΜL
PMV BLD AUTO: NORMAL FL
POTASSIUM SERPL-SCNC: 4.4 MMOL/L
QUANTI TB GOLD PLUS: NORMAL
QUANTI TB1 MINUS NIL: 0.04
QUANTI TB2 MINUS NIL: 0.02
QUANTIFERON MITOGEN: 6.79
QUANTIFERON NIL: 10
RBC # BLD: 4.06 10^6/ΜL
SEDIMENTATION RATE, ERYTHROCYTE: 24
SODIUM BLD-SCNC: 140 MMOL/L
TOTAL PROTEIN: 6.2
WBC # BLD: 6.6 10^3/ML

## 2024-06-04 ENCOUNTER — PATIENT MESSAGE (OUTPATIENT)
Dept: RHEUMATOLOGY | Age: 64
End: 2024-06-04

## 2024-06-04 DIAGNOSIS — M05.79 RHEUMATOID ARTHRITIS INVOLVING MULTIPLE SITES WITH POSITIVE RHEUMATOID FACTOR (HCC): ICD-10-CM

## 2024-06-04 DIAGNOSIS — M06.30 RHEUMATOID NODULOSIS (HCC): ICD-10-CM

## 2024-06-04 NOTE — TELEPHONE ENCOUNTER
From: Sapna Millard  To: Patt Acosta  Sent: 6/4/2024 12:20 PM EDT  Subject: Methotrexate refill    Blood work was done last Friday May 31st at [facility] on [address]. so please check for it and refill prescription

## 2024-06-04 NOTE — TELEPHONE ENCOUNTER
Contacted [facility], representative stated the patient name was spelled wrong. She is fixing the error and will have the orders faxed.

## 2024-06-05 RX ORDER — METHOTREXATE 2.5 MG/1
TABLET ORAL
Qty: 48 TABLET | Refills: 0 | Status: SHIPPED | OUTPATIENT
Start: 2024-06-05

## 2024-06-05 NOTE — TELEPHONE ENCOUNTER
DOLV: 3/5/24  DONV: 9/5/24  LAST LAB DRAW: 5/31/24  LAST TB TEST: 5/31/24    Lab Results   Component Value Date     05/31/2024    K 4.4 05/31/2024     05/31/2024    CO2 22 05/31/2024    BUN 16 05/31/2024    CREATININE 0.72 05/31/2024    GLUCOSE 95 05/31/2024    CALCIUM 9.1 05/31/2024    BILITOT 0.6 05/31/2024    ALKPHOS 72 05/31/2024    AST 16 05/31/2024    ALT 15 05/31/2024    LABGLOM 92 05/31/2024       Recent Labs     05/31/24  1051   WBC 6.6   HGB 13.2   HCT 39.0   MCV 96          Lab Results   Component Value Date    SEDRATE 24 05/31/2024       Lab Results   Component Value Date    CRP 1 02/24/2024

## 2024-07-16 ENCOUNTER — HOSPITAL ENCOUNTER (OUTPATIENT)
Dept: NURSING | Age: 64
End: 2024-07-16

## 2024-07-30 ENCOUNTER — HOSPITAL ENCOUNTER (OUTPATIENT)
Dept: NURSING | Age: 64
Discharge: HOME | End: 2024-07-30
Payer: MEDICARE

## 2024-07-30 VITALS
RESPIRATION RATE: 18 BRPM | WEIGHT: 130 LBS | BODY MASS INDEX: 24.58 KG/M2 | OXYGEN SATURATION: 97 % | SYSTOLIC BLOOD PRESSURE: 128 MMHG | TEMPERATURE: 98.6 F | HEART RATE: 86 BPM | DIASTOLIC BLOOD PRESSURE: 63 MMHG

## 2024-07-30 DIAGNOSIS — M05.79 RHEUMATOID ARTHRITIS INVOLVING MULTIPLE SITES WITH POSITIVE RHEUMATOID FACTOR (HCC): Primary | ICD-10-CM

## 2024-07-30 PROCEDURE — 6370000000 HC RX 637 (ALT 250 FOR IP): Performed by: INTERNAL MEDICINE

## 2024-07-30 PROCEDURE — 6360000002 HC RX W HCPCS: Performed by: INTERNAL MEDICINE

## 2024-07-30 PROCEDURE — 96365 THER/PROPH/DIAG IV INF INIT: CPT

## 2024-07-30 PROCEDURE — 2580000003 HC RX 258: Performed by: INTERNAL MEDICINE

## 2024-07-30 RX ORDER — SODIUM CHLORIDE 0.9 % (FLUSH) 0.9 %
5-40 SYRINGE (ML) INJECTION PRN
OUTPATIENT
Start: 2024-09-10

## 2024-07-30 RX ORDER — ONDANSETRON 2 MG/ML
8 INJECTION INTRAMUSCULAR; INTRAVENOUS
OUTPATIENT
Start: 2024-09-10

## 2024-07-30 RX ORDER — SODIUM CHLORIDE 0.9 % (FLUSH) 0.9 %
5-40 SYRINGE (ML) INJECTION PRN
Status: DISCONTINUED | OUTPATIENT
Start: 2024-07-30 | End: 2024-07-31 | Stop reason: HOSPADM

## 2024-07-30 RX ORDER — SODIUM CHLORIDE 9 MG/ML
5-250 INJECTION, SOLUTION INTRAVENOUS PRN
Status: DISCONTINUED | OUTPATIENT
Start: 2024-07-30 | End: 2024-07-31 | Stop reason: HOSPADM

## 2024-07-30 RX ORDER — SODIUM CHLORIDE 9 MG/ML
5-250 INJECTION, SOLUTION INTRAVENOUS PRN
OUTPATIENT
Start: 2024-09-10

## 2024-07-30 RX ORDER — DIPHENHYDRAMINE HYDROCHLORIDE 50 MG/ML
50 INJECTION INTRAMUSCULAR; INTRAVENOUS
OUTPATIENT
Start: 2024-09-10

## 2024-07-30 RX ORDER — ACETAMINOPHEN 325 MG/1
650 TABLET ORAL
OUTPATIENT
Start: 2024-09-10

## 2024-07-30 RX ORDER — ACETAMINOPHEN 325 MG/1
650 TABLET ORAL ONCE
OUTPATIENT
Start: 2024-09-10

## 2024-07-30 RX ORDER — HEPARIN 100 UNIT/ML
500 SYRINGE INTRAVENOUS PRN
OUTPATIENT
Start: 2024-09-10

## 2024-07-30 RX ORDER — DIPHENHYDRAMINE HCL 25 MG
25 TABLET ORAL ONCE
OUTPATIENT
Start: 2024-09-10 | End: 2024-09-10

## 2024-07-30 RX ORDER — EPINEPHRINE 1 MG/ML
0.3 INJECTION, SOLUTION INTRAMUSCULAR; SUBCUTANEOUS PRN
OUTPATIENT
Start: 2024-09-10

## 2024-07-30 RX ORDER — DIPHENHYDRAMINE HCL 25 MG
25 TABLET ORAL ONCE
Status: COMPLETED | OUTPATIENT
Start: 2024-07-30 | End: 2024-07-30

## 2024-07-30 RX ORDER — ALBUTEROL SULFATE 90 UG/1
4 AEROSOL, METERED RESPIRATORY (INHALATION) PRN
OUTPATIENT
Start: 2024-09-10

## 2024-07-30 RX ORDER — ACETAMINOPHEN 325 MG/1
650 TABLET ORAL ONCE
Status: COMPLETED | OUTPATIENT
Start: 2024-07-30 | End: 2024-07-30

## 2024-07-30 RX ORDER — SODIUM CHLORIDE 9 MG/ML
INJECTION, SOLUTION INTRAVENOUS CONTINUOUS
OUTPATIENT
Start: 2024-09-10

## 2024-07-30 RX ADMIN — ACETAMINOPHEN 650 MG: 325 TABLET ORAL at 11:42

## 2024-07-30 RX ADMIN — SODIUM CHLORIDE, PRESERVATIVE FREE 10 ML: 5 INJECTION INTRAVENOUS at 11:40

## 2024-07-30 RX ADMIN — INFLIXIMAB 200 MG: 100 INJECTION, POWDER, LYOPHILIZED, FOR SOLUTION INTRAVENOUS at 12:12

## 2024-07-30 RX ADMIN — DIPHENHYDRAMINE HYDROCHLORIDE 25 MG: 25 TABLET ORAL at 11:42

## 2024-07-30 RX ADMIN — SODIUM CHLORIDE 20 ML/HR: 9 INJECTION, SOLUTION INTRAVENOUS at 11:40

## 2024-07-30 ASSESSMENT — PAIN - FUNCTIONAL ASSESSMENT: PAIN_FUNCTIONAL_ASSESSMENT: 0-10

## 2024-07-30 NOTE — DISCHARGE INSTRUCTIONS
RENFLEXIS DISCHARGE INSTRUCTION SHEET      Can not have an active infection or be on an antibiotic at time of infusion.       SIDE EFFECTS: RASH, COUGH, JOINT PAIN, SWELLING TO HANDS/FEET, SORE THROAT, FEVER      Call your doctor or return to the nearest Emergency Room if you start having shortness of breath, chest tightness, wheezing      Next Renflexis infusion is scheduled on: Tuesday September 24th at 11:30 am.       Please call [phone] with any questions or concerns.

## 2024-07-30 NOTE — PROGRESS NOTES
Patient admitted to room endo 3, vitals are stable. Patient offered rights and responsibilities.     __m__ Safety:       (Environmental)  [place] to environment  Ensure ID band is correct and in place/ allergy band as needed  Assess for fall risk  Initiate fall precautions as applicable (fall band, side rails, etc.)  Call light within reach  Bed in low position/ wheels locked    _m___ Pain:       Assess pain level and characteristics  Administer analgesics as ordered  Assess effectiveness of pain management and report to MD as needed    __m__ Knowledge Deficit:  Assess baseline knowledge  Provide teaching at level of understanding  Provide teaching via preferred learning method  Evaluate teaching effectiveness    _m___ Hemodynamic/Respiratory Status:       (Pre and Post Procedure Monitoring)  Assess/Monitor vital signs and LOC  Assess Baseline SpO2 prior to any sedation  Obtain weight/height  Assess vital signs/ LOC until patient meets discharge criteria  Monitor procedure site and notify MD of any issues    _

## 2024-08-15 DIAGNOSIS — M06.30 RHEUMATOID NODULOSIS (HCC): ICD-10-CM

## 2024-08-15 DIAGNOSIS — M05.79 RHEUMATOID ARTHRITIS INVOLVING MULTIPLE SITES WITH POSITIVE RHEUMATOID FACTOR (HCC): ICD-10-CM

## 2024-08-15 RX ORDER — FOLIC ACID 1 MG/1
1000 TABLET ORAL DAILY
Qty: 90 TABLET | Refills: 1 | Status: SHIPPED | OUTPATIENT
Start: 2024-08-15

## 2024-08-27 LAB
ALBUMIN: 4.1 G/DL
ALP BLD-CCNC: 77 U/L
ALT SERPL-CCNC: 11 U/L
ANION GAP SERPL CALCULATED.3IONS-SCNC: NORMAL MMOL/L
AST SERPL-CCNC: 14 U/L
BASOPHILS ABSOLUTE: 0 /ΜL
BASOPHILS RELATIVE PERCENT: 1 %
BILIRUB SERPL-MCNC: 0.5 MG/DL (ref 0.1–1.4)
BUN BLDV-MCNC: 8 MG/DL
CALCIUM SERPL-MCNC: 9 MG/DL
CHLORIDE BLD-SCNC: 104 MMOL/L
CO2: 22 MMOL/L
CREAT SERPL-MCNC: 0.59 MG/DL
EOSINOPHILS ABSOLUTE: 0.3 /ΜL
EOSINOPHILS RELATIVE PERCENT: 4 %
GFR, ESTIMATED: 99
GLUCOSE BLD-MCNC: 103 MG/DL
HCT VFR BLD CALC: 43.4 % (ref 36–46)
HEMOGLOBIN: 13.6 G/DL (ref 12–16)
LYMPHOCYTES ABSOLUTE: 1.6 /ΜL
LYMPHOCYTES RELATIVE PERCENT: 25 %
MCH RBC QN AUTO: 31.1 PG
MCHC RBC AUTO-ENTMCNC: 31.3 G/DL
MCV RBC AUTO: 99 FL
MONOCYTES ABSOLUTE: 0.7 /ΜL
MONOCYTES RELATIVE PERCENT: 11 %
NEUTROPHILS ABSOLUTE: 3.7 /ΜL
NEUTROPHILS RELATIVE PERCENT: 59 %
PDW BLD-RTO: 13.3 %
PLATELET # BLD: 363 K/ΜL
PMV BLD AUTO: NORMAL FL
POTASSIUM SERPL-SCNC: 3.9 MMOL/L
RBC # BLD: 4.37 10^6/ΜL
SED RATE, AUTOMATED: 52
SODIUM BLD-SCNC: 139 MMOL/L
TOTAL PROTEIN: 6.6 G/DL (ref 6.4–8.2)
WBC # BLD: 6.4 10^3/ML

## 2024-08-29 DIAGNOSIS — M05.79 RHEUMATOID ARTHRITIS INVOLVING MULTIPLE SITES WITH POSITIVE RHEUMATOID FACTOR (HCC): ICD-10-CM

## 2024-08-29 DIAGNOSIS — M06.30 RHEUMATOID NODULOSIS (HCC): ICD-10-CM

## 2024-08-29 RX ORDER — METHOTREXATE 2.5 MG/1
TABLET ORAL
Qty: 48 TABLET | Refills: 0 | Status: SHIPPED | OUTPATIENT
Start: 2024-08-29

## 2024-09-05 ENCOUNTER — OFFICE VISIT (OUTPATIENT)
Dept: RHEUMATOLOGY | Age: 64
End: 2024-09-05
Payer: MEDICARE

## 2024-09-05 VITALS
OXYGEN SATURATION: 98 % | SYSTOLIC BLOOD PRESSURE: 110 MMHG | DIASTOLIC BLOOD PRESSURE: 72 MMHG | WEIGHT: 125.6 LBS | BODY MASS INDEX: 23.71 KG/M2 | HEIGHT: 61 IN | HEART RATE: 83 BPM

## 2024-09-05 DIAGNOSIS — M05.79 RHEUMATOID ARTHRITIS INVOLVING MULTIPLE SITES WITH POSITIVE RHEUMATOID FACTOR (HCC): Primary | ICD-10-CM

## 2024-09-05 DIAGNOSIS — Z51.81 MEDICATION MONITORING ENCOUNTER: ICD-10-CM

## 2024-09-05 DIAGNOSIS — M06.30 RHEUMATOID NODULOSIS (HCC): ICD-10-CM

## 2024-09-05 DIAGNOSIS — M81.0 OSTEOPOROSIS WITHOUT CURRENT PATHOLOGICAL FRACTURE, UNSPECIFIED OSTEOPOROSIS TYPE: ICD-10-CM

## 2024-09-05 DIAGNOSIS — M17.0 OSTEOARTHRITIS OF BOTH KNEES, UNSPECIFIED OSTEOARTHRITIS TYPE: ICD-10-CM

## 2024-09-05 PROCEDURE — 1036F TOBACCO NON-USER: CPT | Performed by: INTERNAL MEDICINE

## 2024-09-05 PROCEDURE — G8420 CALC BMI NORM PARAMETERS: HCPCS | Performed by: INTERNAL MEDICINE

## 2024-09-05 PROCEDURE — 99214 OFFICE O/P EST MOD 30 MIN: CPT | Performed by: INTERNAL MEDICINE

## 2024-09-05 PROCEDURE — G8427 DOCREV CUR MEDS BY ELIG CLIN: HCPCS | Performed by: INTERNAL MEDICINE

## 2024-09-05 PROCEDURE — G8399 PT W/DXA RESULTS DOCUMENT: HCPCS | Performed by: INTERNAL MEDICINE

## 2024-09-05 PROCEDURE — 1090F PRES/ABSN URINE INCON ASSESS: CPT | Performed by: INTERNAL MEDICINE

## 2024-09-05 PROCEDURE — 3017F COLORECTAL CA SCREEN DOC REV: CPT | Performed by: INTERNAL MEDICINE

## 2024-09-05 PROCEDURE — 1123F ACP DISCUSS/DSCN MKR DOCD: CPT | Performed by: INTERNAL MEDICINE

## 2024-09-05 PROCEDURE — G2211 COMPLEX E/M VISIT ADD ON: HCPCS | Performed by: INTERNAL MEDICINE

## 2024-09-05 RX ORDER — PREDNISONE 5 MG/1
TABLET ORAL
Qty: 40 TABLET | Refills: 0 | Status: SHIPPED | OUTPATIENT
Start: 2024-09-05 | End: 2024-09-21

## 2024-09-05 ASSESSMENT — ENCOUNTER SYMPTOMS
RESPIRATORY NEGATIVE: 1
EYES NEGATIVE: 1
GASTROINTESTINAL NEGATIVE: 1

## 2024-09-05 ASSESSMENT — JOINT PAIN
TOTAL NUMBER OF TENDER JOINTS: 5
TOTAL NUMBER OF SWOLLEN JOINTS: 2

## 2024-09-05 NOTE — PROGRESS NOTES
08/27/2024    LYMPHOPCT 25 08/27/2024    RBC 4.37 08/27/2024    MCH 31.1 08/27/2024    MCHC 31.3 08/27/2024    RDW 13.3 08/27/2024     Lab Results   Component Value Date/Time    CALCIUM 9.0 08/27/2024 09:43 AM     08/27/2024 09:43 AM    K 3.9 08/27/2024 09:43 AM    CO2 22 08/27/2024 09:43 AM     08/27/2024 09:43 AM    BUN 8 08/27/2024 09:43 AM    CREATININE 0.59 08/27/2024 09:43 AM    ALKPHOS 77 08/27/2024 09:43 AM    ALT 11 08/27/2024 09:43 AM    AST 14 08/27/2024 09:43 AM       Lab Results   Component Value Date     (H) 05/10/2021       Lab Results   Component Value Date    SEDRATE 52 08/27/2024     Lab Results   Component Value Date    CRP 1 02/24/2024       RADIOLOGY:         ASSESSMENT/PLAN    Assessment   Plan     Seropositive rheumatoid arthritis +RF, + CCP - active  w/ flare   Rheumatoid nodulosis (HCC)-  resolved   - diagnosed in 1995 with bilateral hand pain and swelling. Dr. Dong started methotrexate + enbrel (2005, sig relief). Methotrexate stopped in 2018 due to ? Remission. + nodulosis bilat elbows with prior removal as well as removals on feet. Exam with no tenderness, + swelling/warmth left wrist, active nodulosis elbows and left ankle.  - prior tx: enbrel 25 mg BID (burning with injection)               - methotrexate 10 mg PO weekly & folic acid 1 mg daily (nov. 2021)    - infliximab 3 mg/kg q 8 weeks (9/7/2022)   - prednisone taper for active dz flare.   - if continued dz activity by the next evaluation or non-sustained response to the infliximab can increase the dosage.        Osteoarthritis of both knees, unspecified osteoarthritis type  - prior tx: cortisone injection (relief), synvisc (relief), s/p replacement 9/2021     osteoporosis - alendronate 70mg weekly (dec. 2022)    - repeat dexa nov. 2024.     Medication monitoring    - CBC, CMP, sed rate, CRP q 12 weeks   - TB gold negative (June 2022) - may 2024    1. Rheumatoid arthritis involving multiple sites with positive

## 2024-09-24 ENCOUNTER — HOSPITAL ENCOUNTER (OUTPATIENT)
Dept: NURSING | Age: 64
Discharge: HOME | End: 2024-09-24
Payer: MEDICARE

## 2024-09-24 VITALS
RESPIRATION RATE: 16 BRPM | WEIGHT: 128 LBS | SYSTOLIC BLOOD PRESSURE: 111 MMHG | TEMPERATURE: 97.9 F | HEART RATE: 82 BPM | OXYGEN SATURATION: 96 % | BODY MASS INDEX: 24.2 KG/M2 | DIASTOLIC BLOOD PRESSURE: 69 MMHG

## 2024-09-24 DIAGNOSIS — M05.79 RHEUMATOID ARTHRITIS INVOLVING MULTIPLE SITES WITH POSITIVE RHEUMATOID FACTOR (HCC): Primary | ICD-10-CM

## 2024-09-24 PROCEDURE — 6360000002 HC RX W HCPCS: Performed by: INTERNAL MEDICINE

## 2024-09-24 PROCEDURE — 6370000000 HC RX 637 (ALT 250 FOR IP): Performed by: INTERNAL MEDICINE

## 2024-09-24 PROCEDURE — 96365 THER/PROPH/DIAG IV INF INIT: CPT

## 2024-09-24 PROCEDURE — 2580000003 HC RX 258: Performed by: INTERNAL MEDICINE

## 2024-09-24 RX ORDER — EPINEPHRINE 1 MG/ML
0.3 INJECTION, SOLUTION INTRAMUSCULAR; SUBCUTANEOUS PRN
OUTPATIENT
Start: 2024-11-19

## 2024-09-24 RX ORDER — SODIUM CHLORIDE 9 MG/ML
5-250 INJECTION, SOLUTION INTRAVENOUS PRN
OUTPATIENT
Start: 2024-11-19

## 2024-09-24 RX ORDER — SODIUM CHLORIDE 0.9 % (FLUSH) 0.9 %
5-40 SYRINGE (ML) INJECTION PRN
OUTPATIENT
Start: 2024-11-19

## 2024-09-24 RX ORDER — ACETAMINOPHEN 325 MG/1
650 TABLET ORAL
OUTPATIENT
Start: 2024-11-19

## 2024-09-24 RX ORDER — DIPHENHYDRAMINE HCL 25 MG
25 TABLET ORAL ONCE
OUTPATIENT
Start: 2024-11-19 | End: 2024-11-19

## 2024-09-24 RX ORDER — ALBUTEROL SULFATE 90 UG/1
4 INHALANT RESPIRATORY (INHALATION) PRN
OUTPATIENT
Start: 2024-11-19

## 2024-09-24 RX ORDER — ACETAMINOPHEN 325 MG/1
650 TABLET ORAL ONCE
OUTPATIENT
Start: 2024-11-19

## 2024-09-24 RX ORDER — HEPARIN 100 UNIT/ML
500 SYRINGE INTRAVENOUS PRN
OUTPATIENT
Start: 2024-11-19

## 2024-09-24 RX ORDER — ONDANSETRON 2 MG/ML
8 INJECTION INTRAMUSCULAR; INTRAVENOUS
OUTPATIENT
Start: 2024-11-19

## 2024-09-24 RX ORDER — DIPHENHYDRAMINE HCL 25 MG
25 TABLET ORAL ONCE
Status: COMPLETED | OUTPATIENT
Start: 2024-09-24 | End: 2024-09-24

## 2024-09-24 RX ORDER — ACETAMINOPHEN 325 MG/1
650 TABLET ORAL ONCE
Status: COMPLETED | OUTPATIENT
Start: 2024-09-24 | End: 2024-09-24

## 2024-09-24 RX ORDER — SODIUM CHLORIDE 9 MG/ML
INJECTION, SOLUTION INTRAVENOUS CONTINUOUS
OUTPATIENT
Start: 2024-11-19

## 2024-09-24 RX ORDER — DIPHENHYDRAMINE HYDROCHLORIDE 50 MG/ML
50 INJECTION INTRAMUSCULAR; INTRAVENOUS
OUTPATIENT
Start: 2024-11-19

## 2024-09-24 RX ORDER — SODIUM CHLORIDE 9 MG/ML
5-250 INJECTION, SOLUTION INTRAVENOUS PRN
Status: DISCONTINUED | OUTPATIENT
Start: 2024-09-24 | End: 2024-09-25 | Stop reason: HOSPADM

## 2024-09-24 RX ADMIN — SODIUM CHLORIDE 20 ML/HR: 9 INJECTION, SOLUTION INTRAVENOUS at 11:59

## 2024-09-24 RX ADMIN — INFLIXIMAB 200 MG: 100 INJECTION, POWDER, LYOPHILIZED, FOR SOLUTION INTRAVENOUS at 12:47

## 2024-09-24 RX ADMIN — ACETAMINOPHEN 650 MG: 325 TABLET ORAL at 11:58

## 2024-09-24 RX ADMIN — DIPHENHYDRAMINE HYDROCHLORIDE 25 MG: 25 TABLET ORAL at 11:58

## 2024-09-24 ASSESSMENT — PAIN SCALES - GENERAL: PAINLEVEL_OUTOF10: 0

## 2024-09-24 NOTE — DISCHARGE INSTRUCTIONS
RENFLEXIS DISCHARGE INSTRUCTION SHEET      Can not have an active infection or be on an antibiotic at time of infusion.       SIDE EFFECTS: RASH, COUGH, JOINT PAIN, SWELLING TO HANDS/FEET, SORE THROAT, FEVER      Call your doctor or return to the nearest Emergency Room if you start having shortness of breath, chest tightness, wheezing          Please call [phone] with any questions or concerns.

## 2024-09-24 NOTE — PROGRESS NOTES
1130  Sapna ambulated into room for renflexis infusion. Pt rights and responsibilities offered to her. Infusion discussed and questions were answered. Sapna states no infections nor atbs today. Pre meds given and call light within reach.     1247  Renflexis infusion started and Sapna has call light within reach    1400   Infusion complete and Sapna tolerated well. D/c instructions explained and Sapna verbalized understanding. Sapna ambulated out for d/c via self today.           _m___ Safety:       (Environmental)  [place] to environment  Ensure ID band is correct and in place/ allergy band as needed  Assess for fall risk  Initiate fall precautions as applicable (fall band, side rails, etc.)  Call light within reach  Bed in low position/ wheels locked    _m___ Pain:       Assess pain level and characteristics  Administer analgesics as ordered  Assess effectiveness of pain management and report to MD as needed    _m___ Knowledge Deficit:  Assess baseline knowledge  Provide teaching at level of understanding  Provide teaching via preferred learning method  Evaluate teaching effectiveness    _m___ Hemodynamic/Respiratory Status:       (Pre and Post Procedure Monitoring)  Assess/Monitor vital signs and LOC  Assess Baseline SpO2 prior to any sedation  Obtain weight/height  Assess vital signs/ LOC until patient meets discharge criteria  Monitor procedure site and notify MD of any issues    _

## 2024-10-04 ENCOUNTER — PATIENT MESSAGE (OUTPATIENT)
Dept: RHEUMATOLOGY | Age: 64
End: 2024-10-04

## 2024-10-07 RX ORDER — ALENDRONATE SODIUM 70 MG/1
70 TABLET ORAL
Qty: 4 TABLET | Refills: 3 | Status: SHIPPED | OUTPATIENT
Start: 2024-10-07

## 2024-10-17 ENCOUNTER — PATIENT MESSAGE (OUTPATIENT)
Dept: RHEUMATOLOGY | Age: 64
End: 2024-10-17

## 2024-10-17 DIAGNOSIS — M06.30 RHEUMATOID NODULOSIS (HCC): ICD-10-CM

## 2024-10-17 DIAGNOSIS — M06.9 RHEUMATOID ARTHRITIS FLARE (HCC): Primary | ICD-10-CM

## 2024-10-17 DIAGNOSIS — Z51.81 MEDICATION MONITORING ENCOUNTER: Primary | ICD-10-CM

## 2024-10-17 DIAGNOSIS — M05.79 RHEUMATOID ARTHRITIS INVOLVING MULTIPLE SITES WITH POSITIVE RHEUMATOID FACTOR (HCC): ICD-10-CM

## 2024-10-17 RX ORDER — PREDNISONE 5 MG/1
TABLET ORAL
Qty: 40 TABLET | Refills: 0 | Status: SHIPPED | OUTPATIENT
Start: 2024-10-17 | End: 2024-11-02

## 2024-10-17 RX ORDER — METHOTREXATE 2.5 MG/1
TABLET ORAL
Qty: 32 TABLET | Refills: 0 | Status: SHIPPED | OUTPATIENT
Start: 2024-10-17

## 2024-10-17 NOTE — TELEPHONE ENCOUNTER
Diagnosis Orders   1. Rheumatoid arthritis flare (HCC)  predniSONE (DELTASONE) 5 MG tablet        - would like to increase the Methotrexate or infliximab - waiting to hear back from pt.

## 2024-11-08 DIAGNOSIS — M06.30 RHEUMATOID NODULOSIS (HCC): ICD-10-CM

## 2024-11-08 DIAGNOSIS — M05.79 RHEUMATOID ARTHRITIS INVOLVING MULTIPLE SITES WITH POSITIVE RHEUMATOID FACTOR (HCC): ICD-10-CM

## 2024-11-11 RX ORDER — METHOTREXATE 2.5 MG/1
TABLET ORAL
Qty: 96 TABLET | Refills: 1 | OUTPATIENT
Start: 2024-11-11

## 2024-11-18 ENCOUNTER — HOSPITAL ENCOUNTER (OUTPATIENT)
Dept: WOMENS IMAGING | Age: 64
Discharge: HOME | End: 2024-11-18
Attending: INTERNAL MEDICINE
Payer: MEDICARE

## 2024-11-18 DIAGNOSIS — M81.0 OSTEOPOROSIS WITHOUT CURRENT PATHOLOGICAL FRACTURE, UNSPECIFIED OSTEOPOROSIS TYPE: ICD-10-CM

## 2024-11-18 PROCEDURE — 77080 DXA BONE DENSITY AXIAL: CPT

## 2024-11-19 ENCOUNTER — HOSPITAL ENCOUNTER (OUTPATIENT)
Dept: NURSING | Age: 64
Discharge: HOME | End: 2024-11-19
Payer: MEDICARE

## 2024-11-19 VITALS
DIASTOLIC BLOOD PRESSURE: 60 MMHG | HEART RATE: 76 BPM | SYSTOLIC BLOOD PRESSURE: 123 MMHG | WEIGHT: 129.8 LBS | RESPIRATION RATE: 18 BRPM | OXYGEN SATURATION: 97 % | TEMPERATURE: 98.6 F | BODY MASS INDEX: 24.54 KG/M2

## 2024-11-19 DIAGNOSIS — M05.79 RHEUMATOID ARTHRITIS INVOLVING MULTIPLE SITES WITH POSITIVE RHEUMATOID FACTOR (HCC): Primary | ICD-10-CM

## 2024-11-19 PROCEDURE — 6370000000 HC RX 637 (ALT 250 FOR IP): Performed by: INTERNAL MEDICINE

## 2024-11-19 PROCEDURE — 96365 THER/PROPH/DIAG IV INF INIT: CPT

## 2024-11-19 PROCEDURE — 2580000003 HC RX 258: Performed by: INTERNAL MEDICINE

## 2024-11-19 PROCEDURE — 6360000002 HC RX W HCPCS: Performed by: INTERNAL MEDICINE

## 2024-11-19 RX ORDER — ACETAMINOPHEN 325 MG/1
650 TABLET ORAL
OUTPATIENT
Start: 2025-01-14

## 2024-11-19 RX ORDER — ONDANSETRON 2 MG/ML
8 INJECTION INTRAMUSCULAR; INTRAVENOUS
OUTPATIENT
Start: 2025-01-14

## 2024-11-19 RX ORDER — SODIUM CHLORIDE 9 MG/ML
INJECTION, SOLUTION INTRAVENOUS CONTINUOUS
OUTPATIENT
Start: 2025-01-14

## 2024-11-19 RX ORDER — EPINEPHRINE 1 MG/ML
0.3 INJECTION, SOLUTION INTRAMUSCULAR; SUBCUTANEOUS PRN
OUTPATIENT
Start: 2025-01-14

## 2024-11-19 RX ORDER — DIPHENHYDRAMINE HCL 25 MG
25 TABLET ORAL ONCE
OUTPATIENT
Start: 2025-01-14 | End: 2025-01-14

## 2024-11-19 RX ORDER — ACETAMINOPHEN 325 MG/1
650 TABLET ORAL ONCE
Status: COMPLETED | OUTPATIENT
Start: 2024-11-19 | End: 2024-11-19

## 2024-11-19 RX ORDER — SODIUM CHLORIDE 0.9 % (FLUSH) 0.9 %
5-40 SYRINGE (ML) INJECTION PRN
OUTPATIENT
Start: 2025-01-14

## 2024-11-19 RX ORDER — ALBUTEROL SULFATE 90 UG/1
4 INHALANT RESPIRATORY (INHALATION) PRN
OUTPATIENT
Start: 2025-01-14

## 2024-11-19 RX ORDER — DIPHENHYDRAMINE HYDROCHLORIDE 50 MG/ML
50 INJECTION INTRAMUSCULAR; INTRAVENOUS
OUTPATIENT
Start: 2025-01-14

## 2024-11-19 RX ORDER — DIPHENHYDRAMINE HCL 25 MG
25 TABLET ORAL ONCE
Status: COMPLETED | OUTPATIENT
Start: 2024-11-19 | End: 2024-11-19

## 2024-11-19 RX ORDER — ACETAMINOPHEN 325 MG/1
650 TABLET ORAL ONCE
OUTPATIENT
Start: 2025-01-14

## 2024-11-19 RX ORDER — SODIUM CHLORIDE 9 MG/ML
5-250 INJECTION, SOLUTION INTRAVENOUS PRN
OUTPATIENT
Start: 2025-01-14

## 2024-11-19 RX ORDER — HYDROCORTISONE SODIUM SUCCINATE 100 MG/2ML
100 INJECTION INTRAMUSCULAR; INTRAVENOUS
OUTPATIENT
Start: 2025-01-14

## 2024-11-19 RX ORDER — HEPARIN 100 UNIT/ML
500 SYRINGE INTRAVENOUS PRN
OUTPATIENT
Start: 2025-01-14

## 2024-11-19 RX ADMIN — DIPHENHYDRAMINE HYDROCHLORIDE 25 MG: 25 TABLET ORAL at 11:36

## 2024-11-19 RX ADMIN — INFLIXIMAB 200 MG: 100 INJECTION, POWDER, LYOPHILIZED, FOR SOLUTION INTRAVENOUS at 12:27

## 2024-11-19 RX ADMIN — ACETAMINOPHEN 650 MG: 325 TABLET ORAL at 11:36

## 2024-11-19 NOTE — PROGRESS NOTES
1120 Patient ambulatory to [facility] for Renflexis infusion. Patient denies any recent infections or antibiotics. PT RIGHTS AND RESPONSIBILITIES OFFERED TO PT.    1136 Pre meds given to patient.     1227 Renflexis infusion started.     1330 Infusion complete. Patient tolerated well. AVS reviewed with patient. Patient left ambulatory to discharge lobby.             _M___ Safety:       (Environmental)  [place] to environment  Ensure ID band is correct and in place/ allergy band as needed  Assess for fall risk  Initiate fall precautions as applicable (fall band, side rails, etc.)  Call light within reach  Bed in low position/ wheels locked    _M___ Pain:       Assess pain level and characteristics  Administer analgesics as ordered  Assess effectiveness of pain management and report to MD as needed    _M___ Knowledge Deficit:  Assess baseline knowledge  Provide teaching at level of understanding  Provide teaching via preferred learning method  Evaluate teaching effectiveness    _M___ Hemodynamic/Respiratory Status:       (Pre and Post Procedure Monitoring)  Assess/Monitor vital signs and LOC  Assess Baseline SpO2 prior to any sedation  Obtain weight/height  Assess vital signs/ LOC until patient meets discharge criteria  Monitor procedure site and notify MD of any issues

## 2024-11-21 ENCOUNTER — PATIENT MESSAGE (OUTPATIENT)
Dept: RHEUMATOLOGY | Age: 64
End: 2024-11-21

## 2024-11-21 NOTE — RESULT ENCOUNTER NOTE
The bone density study does show some improvement since the last evaluation.  Please continue the alendronate 70 mg once weekly

## 2024-12-02 RX ORDER — MELOXICAM 15 MG/1
TABLET ORAL
Qty: 90 TABLET | Refills: 1 | OUTPATIENT
Start: 2024-12-02

## 2024-12-05 DIAGNOSIS — M05.79 RHEUMATOID ARTHRITIS INVOLVING MULTIPLE SITES WITH POSITIVE RHEUMATOID FACTOR (HCC): ICD-10-CM

## 2024-12-05 DIAGNOSIS — M06.30 RHEUMATOID NODULOSIS (HCC): ICD-10-CM

## 2024-12-05 RX ORDER — METHOTREXATE 2.5 MG/1
TABLET ORAL
Qty: 32 TABLET | Refills: 0 | Status: SHIPPED | OUTPATIENT
Start: 2024-12-05

## 2024-12-05 NOTE — TELEPHONE ENCOUNTER
DOLV: 9/5/24  DONV: 12/23/4  LAST LAB DRAW: 11/18/24  LAST TB TEST: 5/31/24    Lab Results   Component Value Date     11/18/2024    K 4.4 11/18/2024     11/18/2024    CO2 25 11/18/2024    BUN 11 11/18/2024    CREATININE 0.76 11/18/2024    GLUCOSE 93 11/18/2024    CALCIUM 9.2 11/18/2024    BILITOT 0.5 11/18/2024    ALKPHOS 78 11/18/2024    AST 15 11/18/2024    ALT 17 11/18/2024    LABGLOM 86 11/18/2024       Recent Labs     11/18/24  1025   WBC 5.2   HGB 12.2   HCT 37.9   MCV 99          Lab Results   Component Value Date    SEDRATE 48 11/18/2024       Lab Results   Component Value Date    CRP 1 02/24/2024

## 2024-12-19 LAB
ALBUMIN: 4.1 G/DL
ALP BLD-CCNC: 76 U/L
ALT SERPL-CCNC: 27 U/L
ANION GAP SERPL CALCULATED.3IONS-SCNC: NORMAL MMOL/L
AST SERPL-CCNC: 25 U/L
BILIRUB SERPL-MCNC: 0.6 MG/DL (ref 0.1–1.4)
BUN BLDV-MCNC: 12 MG/DL
CALCIUM SERPL-MCNC: 9.1 MG/DL
CHLORIDE BLD-SCNC: 104 MMOL/L
CO2: 23 MMOL/L
CREAT SERPL-MCNC: 0.65 MG/DL
GFR, ESTIMATED: 97
GLUCOSE BLD-MCNC: 86 MG/DL
POTASSIUM SERPL-SCNC: 4.1 MMOL/L
SODIUM BLD-SCNC: 141 MMOL/L
TOTAL PROTEIN: 6.5 G/DL (ref 6.4–8.2)

## 2024-12-23 ENCOUNTER — OFFICE VISIT (OUTPATIENT)
Age: 64
End: 2024-12-23
Payer: MEDICARE

## 2024-12-23 VITALS
OXYGEN SATURATION: 98 % | HEIGHT: 61 IN | HEART RATE: 84 BPM | DIASTOLIC BLOOD PRESSURE: 80 MMHG | SYSTOLIC BLOOD PRESSURE: 132 MMHG | WEIGHT: 131 LBS | BODY MASS INDEX: 24.73 KG/M2

## 2024-12-23 DIAGNOSIS — M06.30 RHEUMATOID NODULOSIS (HCC): ICD-10-CM

## 2024-12-23 DIAGNOSIS — M05.79 RHEUMATOID ARTHRITIS INVOLVING MULTIPLE SITES WITH POSITIVE RHEUMATOID FACTOR (HCC): Primary | ICD-10-CM

## 2024-12-23 DIAGNOSIS — M81.0 OSTEOPOROSIS WITHOUT CURRENT PATHOLOGICAL FRACTURE, UNSPECIFIED OSTEOPOROSIS TYPE: ICD-10-CM

## 2024-12-23 DIAGNOSIS — M17.0 OSTEOARTHRITIS OF BOTH KNEES, UNSPECIFIED OSTEOARTHRITIS TYPE: ICD-10-CM

## 2024-12-23 DIAGNOSIS — Z51.81 MEDICATION MONITORING ENCOUNTER: ICD-10-CM

## 2024-12-23 DIAGNOSIS — M05.79 RHEUMATOID ARTHRITIS INVOLVING MULTIPLE SITES WITH POSITIVE RHEUMATOID FACTOR (HCC): ICD-10-CM

## 2024-12-23 PROCEDURE — 1090F PRES/ABSN URINE INCON ASSESS: CPT | Performed by: NURSE PRACTITIONER

## 2024-12-23 PROCEDURE — 1036F TOBACCO NON-USER: CPT | Performed by: NURSE PRACTITIONER

## 2024-12-23 PROCEDURE — G2211 COMPLEX E/M VISIT ADD ON: HCPCS | Performed by: NURSE PRACTITIONER

## 2024-12-23 PROCEDURE — 1126F AMNT PAIN NOTED NONE PRSNT: CPT | Performed by: NURSE PRACTITIONER

## 2024-12-23 PROCEDURE — 1159F MED LIST DOCD IN RCRD: CPT | Performed by: NURSE PRACTITIONER

## 2024-12-23 PROCEDURE — G8427 DOCREV CUR MEDS BY ELIG CLIN: HCPCS | Performed by: NURSE PRACTITIONER

## 2024-12-23 PROCEDURE — G8420 CALC BMI NORM PARAMETERS: HCPCS | Performed by: NURSE PRACTITIONER

## 2024-12-23 PROCEDURE — 3017F COLORECTAL CA SCREEN DOC REV: CPT | Performed by: NURSE PRACTITIONER

## 2024-12-23 PROCEDURE — 1123F ACP DISCUSS/DSCN MKR DOCD: CPT | Performed by: NURSE PRACTITIONER

## 2024-12-23 PROCEDURE — 99214 OFFICE O/P EST MOD 30 MIN: CPT | Performed by: NURSE PRACTITIONER

## 2024-12-23 PROCEDURE — 1160F RVW MEDS BY RX/DR IN RCRD: CPT | Performed by: NURSE PRACTITIONER

## 2024-12-23 PROCEDURE — G8399 PT W/DXA RESULTS DOCUMENT: HCPCS | Performed by: NURSE PRACTITIONER

## 2024-12-23 PROCEDURE — G8484 FLU IMMUNIZE NO ADMIN: HCPCS | Performed by: NURSE PRACTITIONER

## 2024-12-23 PROCEDURE — 99213 OFFICE O/P EST LOW 20 MIN: CPT | Performed by: NURSE PRACTITIONER

## 2024-12-23 RX ORDER — FOLIC ACID 1 MG/1
1000 TABLET ORAL DAILY
Qty: 90 TABLET | Refills: 1 | Status: SHIPPED | OUTPATIENT
Start: 2024-12-23

## 2024-12-23 RX ORDER — METHOTREXATE 2.5 MG/1
TABLET ORAL
Qty: 32 TABLET | Refills: 0 | Status: SHIPPED | OUTPATIENT
Start: 2024-12-23 | End: 2024-12-23 | Stop reason: SDUPTHER

## 2024-12-23 RX ORDER — METHOTREXATE 2.5 MG/1
TABLET ORAL
Qty: 32 TABLET | Refills: 1 | Status: SHIPPED | OUTPATIENT
Start: 2024-12-23

## 2024-12-23 RX ORDER — MELOXICAM 15 MG/1
15 TABLET ORAL DAILY PRN
Qty: 30 TABLET | Refills: 5 | Status: SHIPPED | OUTPATIENT
Start: 2024-12-23

## 2024-12-23 ASSESSMENT — ENCOUNTER SYMPTOMS
TROUBLE SWALLOWING: 0
CONSTIPATION: 0
ABDOMINAL PAIN: 0
NAUSEA: 0
EYE ITCHING: 0
DIARRHEA: 0
EYE PAIN: 0
SHORTNESS OF BREATH: 0
BACK PAIN: 0
COUGH: 0

## 2024-12-23 NOTE — PROGRESS NOTES
[facility] RHEUMATOLOGY FOLLOW UP NOTE       Date Of Service: 12/23/2024  Provider: Patt Acosta, APRN - CNP    Name: Sapna Millard   MRN: [id number]    Chief Complaint(s)     Chief Complaint   Patient presents with    Follow-up     4 month f/u RA          History of Present Illness (HPI)     Sapna Millard  is a(n)64 y.o. female with a hx of osteoarthritis bilateral knees, rheumatoid arthritis, left hand knuckle replacement, left elbow nodule removal (2010), nodule removals bilateral feet (2005) here for the f/u evaluation of rheumatoid arthritis.      Interval hx:    - took prednisone taper with significant relief   - feeling better than previous evaluation.     Denies any joint pains, swelling or morning stiffness.       REVIEW OF SYSTEMS: (ROS)    Review of Systems   Constitutional:  Negative for fatigue, fever and unexpected weight change.   HENT:  Negative for congestion and trouble swallowing.    Eyes:  Negative for pain and itching.   Respiratory:  Negative for cough and shortness of breath.    Cardiovascular:  Negative for chest pain and leg swelling.   Gastrointestinal:  Negative for abdominal pain, constipation, diarrhea and nausea.   Endocrine: Negative for cold intolerance and heat intolerance.   Genitourinary:  Negative for difficulty urinating, frequency and urgency.   Musculoskeletal:  Negative for arthralgias, back pain and joint swelling.   Skin:  Negative for rash.   Neurological:  Negative for dizziness, weakness, numbness and headaches.   Psychiatric/Behavioral:  The patient is not nervous/anxious.        PAST MEDICAL HISTORY      Past Medical History:   Diagnosis Date    High cholesterol     Hypertension     RA (rheumatoid arthritis) (HCC)        PAST SURGICAL HISTORY      Past Surgical History:   Procedure Laterality Date    KNEE SURGERY Right     REVISION TOTAL KNEE ARTHROPLASTY Right        FAMILY HISTORY    History reviewed. No pertinent family history.    SOCIAL HISTORY

## 2024-12-31 RX ORDER — ALENDRONATE SODIUM 70 MG/1
TABLET ORAL
Qty: 12 TABLET | Refills: 1 | Status: SHIPPED | OUTPATIENT
Start: 2024-12-31

## 2025-01-13 DIAGNOSIS — M06.30 RHEUMATOID NODULOSIS (HCC): ICD-10-CM

## 2025-01-13 DIAGNOSIS — M05.79 RHEUMATOID ARTHRITIS INVOLVING MULTIPLE SITES WITH POSITIVE RHEUMATOID FACTOR (HCC): ICD-10-CM

## 2025-01-13 RX ORDER — METHOTREXATE 2.5 MG/1
TABLET ORAL
Qty: 96 TABLET | Refills: 1 | OUTPATIENT
Start: 2025-01-13

## 2025-01-21 ENCOUNTER — HOSPITAL ENCOUNTER (OUTPATIENT)
Dept: NURSING | Age: 65
Discharge: HOME | End: 2025-01-21
Payer: MEDICARE

## 2025-01-21 ENCOUNTER — TELEPHONE (OUTPATIENT)
Age: 65
End: 2025-01-21

## 2025-01-21 VITALS
SYSTOLIC BLOOD PRESSURE: 119 MMHG | OXYGEN SATURATION: 99 % | WEIGHT: 130 LBS | RESPIRATION RATE: 18 BRPM | DIASTOLIC BLOOD PRESSURE: 66 MMHG | TEMPERATURE: 97.6 F | BODY MASS INDEX: 24.58 KG/M2 | HEART RATE: 83 BPM

## 2025-01-21 DIAGNOSIS — M05.79 RHEUMATOID ARTHRITIS INVOLVING MULTIPLE SITES WITH POSITIVE RHEUMATOID FACTOR (HCC): Primary | ICD-10-CM

## 2025-01-21 PROCEDURE — 2580000003 HC RX 258: Performed by: INTERNAL MEDICINE

## 2025-01-21 PROCEDURE — 6370000000 HC RX 637 (ALT 250 FOR IP): Performed by: INTERNAL MEDICINE

## 2025-01-21 PROCEDURE — 96365 THER/PROPH/DIAG IV INF INIT: CPT

## 2025-01-21 PROCEDURE — 6360000002 HC RX W HCPCS: Performed by: INTERNAL MEDICINE

## 2025-01-21 RX ORDER — SODIUM CHLORIDE 0.9 % (FLUSH) 0.9 %
5-40 SYRINGE (ML) INJECTION PRN
OUTPATIENT
Start: 2025-03-11

## 2025-01-21 RX ORDER — DIPHENHYDRAMINE HCL 25 MG
25 TABLET ORAL ONCE
OUTPATIENT
Start: 2025-03-11 | End: 2025-03-11

## 2025-01-21 RX ORDER — SODIUM CHLORIDE 9 MG/ML
5-250 INJECTION, SOLUTION INTRAVENOUS PRN
OUTPATIENT
Start: 2025-03-11

## 2025-01-21 RX ORDER — ALBUTEROL SULFATE 90 UG/1
4 INHALANT RESPIRATORY (INHALATION) PRN
OUTPATIENT
Start: 2025-03-11

## 2025-01-21 RX ORDER — ACETAMINOPHEN 325 MG/1
650 TABLET ORAL
OUTPATIENT
Start: 2025-03-11

## 2025-01-21 RX ORDER — HYDROCORTISONE SODIUM SUCCINATE 100 MG/2ML
100 INJECTION INTRAMUSCULAR; INTRAVENOUS
OUTPATIENT
Start: 2025-03-11

## 2025-01-21 RX ORDER — DIPHENHYDRAMINE HCL 25 MG
25 TABLET ORAL ONCE
Status: COMPLETED | OUTPATIENT
Start: 2025-01-21 | End: 2025-01-21

## 2025-01-21 RX ORDER — ONDANSETRON 2 MG/ML
8 INJECTION INTRAMUSCULAR; INTRAVENOUS
OUTPATIENT
Start: 2025-03-11

## 2025-01-21 RX ORDER — ACETAMINOPHEN 325 MG/1
650 TABLET ORAL ONCE
Status: COMPLETED | OUTPATIENT
Start: 2025-01-21 | End: 2025-01-21

## 2025-01-21 RX ORDER — ACETAMINOPHEN 325 MG/1
650 TABLET ORAL ONCE
OUTPATIENT
Start: 2025-03-11

## 2025-01-21 RX ORDER — EPINEPHRINE 1 MG/ML
0.3 INJECTION, SOLUTION INTRAMUSCULAR; SUBCUTANEOUS PRN
OUTPATIENT
Start: 2025-03-11

## 2025-01-21 RX ORDER — SODIUM CHLORIDE 9 MG/ML
INJECTION, SOLUTION INTRAVENOUS CONTINUOUS
OUTPATIENT
Start: 2025-03-11

## 2025-01-21 RX ORDER — SODIUM CHLORIDE 9 MG/ML
5-250 INJECTION, SOLUTION INTRAVENOUS PRN
Status: DISCONTINUED | OUTPATIENT
Start: 2025-01-21 | End: 2025-01-22 | Stop reason: HOSPADM

## 2025-01-21 RX ORDER — HEPARIN 100 UNIT/ML
500 SYRINGE INTRAVENOUS PRN
OUTPATIENT
Start: 2025-03-11

## 2025-01-21 RX ORDER — DIPHENHYDRAMINE HYDROCHLORIDE 50 MG/ML
50 INJECTION INTRAMUSCULAR; INTRAVENOUS
OUTPATIENT
Start: 2025-03-11

## 2025-01-21 RX ADMIN — ACETAMINOPHEN 650 MG: 325 TABLET ORAL at 11:27

## 2025-01-21 RX ADMIN — INFLIXIMAB 200 MG: 100 INJECTION, POWDER, LYOPHILIZED, FOR SOLUTION INTRAVENOUS at 11:58

## 2025-01-21 RX ADMIN — DIPHENHYDRAMINE HYDROCHLORIDE 25 MG: 25 TABLET ORAL at 11:27

## 2025-01-21 NOTE — PROGRESS NOTES
1116 Patient arrived to [facility] ambulatory for renflexis infusion.  Oriented to room and call light  PT RIGHTS AND RESPONSIBILITIES OFFERED TO PT.  She denies recent infection or antibiotic use.  Patient states she will be out of  town in 8 weeks and requesting to be scheduled March 27 for next appointment    1127 pre-medications given and she denies complaints    1158 Medication started and she denies complaints      _M___ Safety:       (Environmental)  [place] to environment  Ensure ID band is correct and in place/ allergy band as needed  Assess for fall risk  Initiate fall precautions as applicable (fall band, side rails, etc.)  Call light within reach  Bed in low position/ wheels locked    _M___ Pain:       Assess pain level and characteristics  Administer analgesics as ordered  Assess effectiveness of pain management and report to MD as needed    _M___ Knowledge Deficit:  Assess baseline knowledge  Provide teaching at level of understanding  Provide teaching via preferred learning method  Evaluate teaching effectiveness    _M___ Hemodynamic/Respiratory Status:       (Pre and Post Procedure Monitoring)  Assess/Monitor vital signs and LOC  Assess Baseline SpO2 prior to any sedation  Obtain weight/height  Assess vital signs/ LOC until patient meets discharge criteria  Monitor procedure site and notify MD of any issues

## 2025-01-21 NOTE — DISCHARGE INSTRUCTIONS
RENFLEXIS DISCHARGE INSTRUCTION SHEET      Can not have an active infection or be on an antibiotic at time of infusion.       SIDE EFFECTS: RASH, COUGH, JOINT PAIN, SWELLING TO HANDS/FEET, SORE THROAT, FEVER      Call your doctor or return to the nearest Emergency Room if you start having shortness of breath, chest tightness, wheezing      Next Renflexis infusion is scheduled on: March 27 at 11:30      Please call [phone] with any questions or concerns.

## 2025-01-21 NOTE — PROGRESS NOTES
1301: Infusion complete, patient tolerated well. No concerns voiced.   AVS reviewed with patient, voiced understanding. Patient discharged ambulatory.

## 2025-01-21 NOTE — TELEPHONE ENCOUNTER
Received call from [facility] OPN stating patient Infliximab therapy plan orders will  prior to next infusion. Thank you.

## 2025-03-13 DIAGNOSIS — M06.30 RHEUMATOID NODULOSIS (HCC): ICD-10-CM

## 2025-03-13 DIAGNOSIS — M05.79 RHEUMATOID ARTHRITIS INVOLVING MULTIPLE SITES WITH POSITIVE RHEUMATOID FACTOR (HCC): ICD-10-CM

## 2025-03-13 RX ORDER — METHOTREXATE 2.5 MG/1
TABLET ORAL
Qty: 96 TABLET | Refills: 1 | OUTPATIENT
Start: 2025-03-13

## 2025-03-27 ENCOUNTER — HOSPITAL ENCOUNTER (OUTPATIENT)
Dept: NURSING | Age: 65
Discharge: HOME | End: 2025-03-27
Payer: MEDICARE

## 2025-03-27 VITALS
DIASTOLIC BLOOD PRESSURE: 71 MMHG | TEMPERATURE: 97.9 F | SYSTOLIC BLOOD PRESSURE: 121 MMHG | RESPIRATION RATE: 18 BRPM | BODY MASS INDEX: 24.2 KG/M2 | HEART RATE: 89 BPM | WEIGHT: 128 LBS | OXYGEN SATURATION: 98 %

## 2025-03-27 DIAGNOSIS — M05.79 RHEUMATOID ARTHRITIS INVOLVING MULTIPLE SITES WITH POSITIVE RHEUMATOID FACTOR (HCC): Primary | ICD-10-CM

## 2025-03-27 PROCEDURE — 6360000002 HC RX W HCPCS: Performed by: NURSE PRACTITIONER

## 2025-03-27 PROCEDURE — 96365 THER/PROPH/DIAG IV INF INIT: CPT

## 2025-03-27 PROCEDURE — 6370000000 HC RX 637 (ALT 250 FOR IP): Performed by: NURSE PRACTITIONER

## 2025-03-27 PROCEDURE — 2580000003 HC RX 258: Performed by: NURSE PRACTITIONER

## 2025-03-27 PROCEDURE — 99211 OFF/OP EST MAY X REQ PHY/QHP: CPT

## 2025-03-27 RX ORDER — SODIUM CHLORIDE 9 MG/ML
5-250 INJECTION, SOLUTION INTRAVENOUS PRN
OUTPATIENT
Start: 2025-05-15

## 2025-03-27 RX ORDER — DIPHENHYDRAMINE HYDROCHLORIDE 50 MG/ML
50 INJECTION, SOLUTION INTRAMUSCULAR; INTRAVENOUS
OUTPATIENT
Start: 2025-05-15

## 2025-03-27 RX ORDER — ACETAMINOPHEN 325 MG/1
650 TABLET ORAL ONCE
OUTPATIENT
Start: 2025-05-15

## 2025-03-27 RX ORDER — DIPHENHYDRAMINE HCL 25 MG
25 TABLET ORAL ONCE
OUTPATIENT
Start: 2025-05-15 | End: 2025-05-15

## 2025-03-27 RX ORDER — SODIUM CHLORIDE 9 MG/ML
5-250 INJECTION, SOLUTION INTRAVENOUS PRN
Status: DISCONTINUED | OUTPATIENT
Start: 2025-03-27 | End: 2025-03-28 | Stop reason: HOSPADM

## 2025-03-27 RX ORDER — HYDROCORTISONE SODIUM SUCCINATE 100 MG/2ML
100 INJECTION INTRAMUSCULAR; INTRAVENOUS
OUTPATIENT
Start: 2025-05-15

## 2025-03-27 RX ORDER — DIPHENHYDRAMINE HCL 25 MG
25 TABLET ORAL ONCE
Status: COMPLETED | OUTPATIENT
Start: 2025-03-27 | End: 2025-03-27

## 2025-03-27 RX ORDER — ACETAMINOPHEN 325 MG/1
650 TABLET ORAL
OUTPATIENT
Start: 2025-05-15

## 2025-03-27 RX ORDER — HEPARIN 100 UNIT/ML
500 SYRINGE INTRAVENOUS PRN
OUTPATIENT
Start: 2025-05-15

## 2025-03-27 RX ORDER — ACETAMINOPHEN 325 MG/1
650 TABLET ORAL ONCE
Status: COMPLETED | OUTPATIENT
Start: 2025-03-27 | End: 2025-03-27

## 2025-03-27 RX ORDER — ONDANSETRON 2 MG/ML
8 INJECTION INTRAMUSCULAR; INTRAVENOUS
OUTPATIENT
Start: 2025-05-15

## 2025-03-27 RX ORDER — ALBUTEROL SULFATE 90 UG/1
4 INHALANT RESPIRATORY (INHALATION) PRN
OUTPATIENT
Start: 2025-05-15

## 2025-03-27 RX ORDER — SODIUM CHLORIDE 0.9 % (FLUSH) 0.9 %
5-40 SYRINGE (ML) INJECTION PRN
OUTPATIENT
Start: 2025-05-15

## 2025-03-27 RX ORDER — EPINEPHRINE 1 MG/ML
0.3 INJECTION, SOLUTION INTRAMUSCULAR; SUBCUTANEOUS PRN
OUTPATIENT
Start: 2025-05-15

## 2025-03-27 RX ORDER — SODIUM CHLORIDE 9 MG/ML
INJECTION, SOLUTION INTRAVENOUS CONTINUOUS
OUTPATIENT
Start: 2025-05-15

## 2025-03-27 RX ADMIN — ACETAMINOPHEN 650 MG: 325 TABLET ORAL at 11:29

## 2025-03-27 RX ADMIN — DIPHENHYDRAMINE HYDROCHLORIDE 25 MG: 25 TABLET ORAL at 11:29

## 2025-03-27 RX ADMIN — INFLIXIMAB 200 MG: 100 INJECTION, POWDER, LYOPHILIZED, FOR SOLUTION INTRAVENOUS at 12:29

## 2025-03-27 ASSESSMENT — PAIN - FUNCTIONAL ASSESSMENT: PAIN_FUNCTIONAL_ASSESSMENT: NONE - DENIES PAIN

## 2025-03-27 NOTE — PROGRESS NOTES
1111 Patient arrived to [facility] ambulatory for renflexis infusion.  Oriented to room and call light  PT RIGHTS AND RESPONSIBILITIES OFFERED TO PT.  She denies recent infection or antibiotic use     1129 Pre-medications given and she denies complaints     1229 Medication started and she denies complaints     1334 Medication completed and she denies complaints.  Iv removed.  Discharge instructions given and explained and she denies questions.  Discharged ambulatory       _M___ Safety:       (Environmental)  [place] to environment  Ensure ID band is correct and in place/ allergy band as needed  Assess for fall risk  Initiate fall precautions as applicable (fall band, side rails, etc.)  Call light within reach  Bed in low position/ wheels locked    _M___ Pain:       Assess pain level and characteristics  Administer analgesics as ordered  Assess effectiveness of pain management and report to MD as needed    _M___ Knowledge Deficit:  Assess baseline knowledge  Provide teaching at level of understanding  Provide teaching via preferred learning method  Evaluate teaching effectiveness    _M___ Hemodynamic/Respiratory Status:       (Pre and Post Procedure Monitoring)  Assess/Monitor vital signs and LOC  Assess Baseline SpO2 prior to any sedation  Obtain weight/height  Assess vital signs/ LOC until patient meets discharge criteria  Monitor procedure site and notify MD of any issues

## 2025-03-27 NOTE — DISCHARGE INSTRUCTIONS
RENFLEXIS DISCHARGE INSTRUCTION SHEET      Can not have an active infection or be on an antibiotic at time of infusion.       SIDE EFFECTS: RASH, COUGH, JOINT PAIN, SWELLING TO HANDS/FEET, SORE THROAT, FEVER      Call your doctor or return to the nearest Emergency Room if you start having shortness of breath, chest tightness, wheezing      Next Renflexis infusion is scheduled on: May 22 at 11:30       Please call [phone] with any questions or concerns.

## 2025-03-28 LAB
ALBUMIN: 4 G/DL
ALP BLD-CCNC: 74 U/L
ALT SERPL-CCNC: 14 U/L
ANION GAP SERPL CALCULATED.3IONS-SCNC: ABNORMAL MMOL/L
AST SERPL-CCNC: 21 U/L
BASOPHILS ABSOLUTE: 0 /ΜL
BASOPHILS RELATIVE PERCENT: 1 %
BILIRUB SERPL-MCNC: 0.6 MG/DL (ref 0.1–1.4)
BUN BLDV-MCNC: 12 MG/DL
C-REACTIVE PROTEIN: 1
CALCIUM SERPL-MCNC: 8.7 MG/DL
CHLORIDE BLD-SCNC: 106 MMOL/L
CO2: 20 MMOL/L
CREAT SERPL-MCNC: 0.63 MG/DL
EOSINOPHILS ABSOLUTE: 0.5 /ΜL
EOSINOPHILS RELATIVE PERCENT: 8 %
GFR, ESTIMATED: 97
GLUCOSE BLD-MCNC: 83 MG/DL
HCT VFR BLD CALC: 41.1 % (ref 36–46)
HEMOGLOBIN: 13.3 G/DL (ref 12–16)
LYMPHOCYTES ABSOLUTE: 1 /ΜL
LYMPHOCYTES RELATIVE PERCENT: 17 %
MCH RBC QN AUTO: 31.8 PG
MCHC RBC AUTO-ENTMCNC: 32.4 G/DL
MCV RBC AUTO: 98 FL
MONOCYTES ABSOLUTE: 0.4 /ΜL
MONOCYTES RELATIVE PERCENT: 7 %
NEUTROPHILS ABSOLUTE: 4.2 /ΜL
NEUTROPHILS RELATIVE PERCENT: 67 %
PDW BLD-RTO: 13.7 %
PLATELET # BLD: 318 K/ΜL
PMV BLD AUTO: NORMAL FL
POTASSIUM SERPL-SCNC: 4.4 MMOL/L
RBC # BLD: 4.18 10^6/ΜL
SED RATE, AUTOMATED: 56
SODIUM BLD-SCNC: 141 MMOL/L
TOTAL PROTEIN: 6.3 G/DL (ref 6.4–8.2)
WBC # BLD: 6.2 10^3/ML

## 2025-04-01 ENCOUNTER — RESULTS FOLLOW-UP (OUTPATIENT)
Age: 65
End: 2025-04-01

## 2025-04-01 DIAGNOSIS — M06.30 RHEUMATOID NODULOSIS (HCC): ICD-10-CM

## 2025-04-01 DIAGNOSIS — M05.79 RHEUMATOID ARTHRITIS INVOLVING MULTIPLE SITES WITH POSITIVE RHEUMATOID FACTOR (HCC): ICD-10-CM

## 2025-04-01 RX ORDER — METHOTREXATE 2.5 MG/1
TABLET ORAL
Qty: 32 TABLET | Refills: 1 | Status: SHIPPED | OUTPATIENT
Start: 2025-04-01

## 2025-04-24 ENCOUNTER — OFFICE VISIT (OUTPATIENT)
Age: 65
End: 2025-04-24
Payer: MEDICARE

## 2025-04-24 VITALS
DIASTOLIC BLOOD PRESSURE: 60 MMHG | HEIGHT: 61 IN | HEART RATE: 74 BPM | OXYGEN SATURATION: 97 % | SYSTOLIC BLOOD PRESSURE: 124 MMHG | WEIGHT: 132.4 LBS | BODY MASS INDEX: 25 KG/M2

## 2025-04-24 DIAGNOSIS — Z51.81 MEDICATION MONITORING ENCOUNTER: ICD-10-CM

## 2025-04-24 DIAGNOSIS — M17.0 OSTEOARTHRITIS OF BOTH KNEES, UNSPECIFIED OSTEOARTHRITIS TYPE: ICD-10-CM

## 2025-04-24 DIAGNOSIS — M81.0 OSTEOPOROSIS WITHOUT CURRENT PATHOLOGICAL FRACTURE, UNSPECIFIED OSTEOPOROSIS TYPE: ICD-10-CM

## 2025-04-24 DIAGNOSIS — M05.79 RHEUMATOID ARTHRITIS INVOLVING MULTIPLE SITES WITH POSITIVE RHEUMATOID FACTOR (HCC): Primary | ICD-10-CM

## 2025-04-24 DIAGNOSIS — M06.30 RHEUMATOID NODULOSIS (HCC): ICD-10-CM

## 2025-04-24 PROCEDURE — G8419 CALC BMI OUT NRM PARAM NOF/U: HCPCS | Performed by: INTERNAL MEDICINE

## 2025-04-24 PROCEDURE — 99213 OFFICE O/P EST LOW 20 MIN: CPT | Performed by: INTERNAL MEDICINE

## 2025-04-24 PROCEDURE — 1126F AMNT PAIN NOTED NONE PRSNT: CPT | Performed by: INTERNAL MEDICINE

## 2025-04-24 PROCEDURE — 1159F MED LIST DOCD IN RCRD: CPT | Performed by: INTERNAL MEDICINE

## 2025-04-24 PROCEDURE — 1160F RVW MEDS BY RX/DR IN RCRD: CPT | Performed by: INTERNAL MEDICINE

## 2025-04-24 PROCEDURE — G8427 DOCREV CUR MEDS BY ELIG CLIN: HCPCS | Performed by: INTERNAL MEDICINE

## 2025-04-24 PROCEDURE — 99214 OFFICE O/P EST MOD 30 MIN: CPT | Performed by: INTERNAL MEDICINE

## 2025-04-24 PROCEDURE — 1123F ACP DISCUSS/DSCN MKR DOCD: CPT | Performed by: INTERNAL MEDICINE

## 2025-04-24 PROCEDURE — 1036F TOBACCO NON-USER: CPT | Performed by: INTERNAL MEDICINE

## 2025-04-24 PROCEDURE — G8399 PT W/DXA RESULTS DOCUMENT: HCPCS | Performed by: INTERNAL MEDICINE

## 2025-04-24 PROCEDURE — G2211 COMPLEX E/M VISIT ADD ON: HCPCS | Performed by: INTERNAL MEDICINE

## 2025-04-24 PROCEDURE — 1090F PRES/ABSN URINE INCON ASSESS: CPT | Performed by: INTERNAL MEDICINE

## 2025-04-24 PROCEDURE — 3017F COLORECTAL CA SCREEN DOC REV: CPT | Performed by: INTERNAL MEDICINE

## 2025-04-24 RX ORDER — ALENDRONATE SODIUM 70 MG/1
70 TABLET ORAL
Qty: 12 TABLET | Refills: 1 | Status: SHIPPED | OUTPATIENT
Start: 2025-04-24

## 2025-04-24 RX ORDER — FOLIC ACID 1 MG/1
1000 TABLET ORAL DAILY
Qty: 90 TABLET | Refills: 3 | Status: SHIPPED | OUTPATIENT
Start: 2025-04-24

## 2025-04-24 ASSESSMENT — ENCOUNTER SYMPTOMS
EYES NEGATIVE: 1
RESPIRATORY NEGATIVE: 1
GASTROINTESTINAL NEGATIVE: 1

## 2025-04-24 NOTE — PROGRESS NOTES
[facility] RHEUMATOLOGY FOLLOW UP NOTE       Date Of Service: 4/24/2025  Provider: LASHELL GARCIA DO    Name: Sapna Millard   MRN: [id number]    Chief Complaint(s)     Chief Complaint   Patient presents with    Follow-up     4 month follow up RA        History of Present Illness (HPI)     Sapna Millard  is a(n)65 y.o. female with a hx of osteoarthritis bilateral knees, rheumatoid arthritis, left hand knuckle replacement, left elbow nodule removal (2010), nodule removals bilateral feet (2005) here for the f/u evaluation of rheumatoid arthritis.      Interval hx:     Rheumatoid Arthritis / osteoarthritis  - taking medications - Denies any joint pains, swelling or morning stiffness.     osteoporosis - denies falls, jaw pain, thigh pain       REVIEW OF SYSTEMS: (ROS)    Review of Systems   Constitutional: Negative.    HENT: Negative.     Eyes: Negative.    Respiratory: Negative.     Cardiovascular: Negative.    Gastrointestinal: Negative.    Endocrine: Negative.    Genitourinary: Negative.    Skin: Negative.    Neurological: Negative.    Hematological: Negative.        PmHx:  has a past medical history of High cholesterol, Hypertension, and RA (rheumatoid arthritis) (HCC).     Social History:  reports that she has never smoked. She has never used smokeless tobacco. She reports that she does not drink alcohol and does not use drugs.    No Known Allergies    Current Outpatient Medications   Medication Instructions    alendronate (FOSAMAX) 70 MG tablet TAKE 1 TABLET BY MOUTH ONE TIME PER WEEK    atorvastatin (LIPITOR) 20 MG tablet TAKE 1 TABLET BY MOUTH ONCE DAILY    folic acid (FOLVITE) 1,000 mcg, Oral, DAILY    lisinopril (PRINIVIL;ZESTRIL) 20 MG tablet TAKE 1 TABLET BY MOUTH EVERY DAY AS DIRECTED    meloxicam (MOBIC) 15 mg, Oral, DAILY PRN    methotrexate (RHEUMATREX) 2.5 MG chemo tablet Take 4 tablets by mouth in the morning and 4 tablets in the evening once weekly       Objective     /60 (BP Site: Left

## 2025-05-22 ENCOUNTER — HOSPITAL ENCOUNTER (OUTPATIENT)
Dept: NURSING | Age: 65
Discharge: HOME | End: 2025-05-22
Payer: MEDICARE

## 2025-05-22 VITALS
DIASTOLIC BLOOD PRESSURE: 61 MMHG | WEIGHT: 133 LBS | OXYGEN SATURATION: 97 % | SYSTOLIC BLOOD PRESSURE: 134 MMHG | BODY MASS INDEX: 25.14 KG/M2 | TEMPERATURE: 96.8 F | HEART RATE: 83 BPM | RESPIRATION RATE: 16 BRPM

## 2025-05-22 DIAGNOSIS — M05.79 RHEUMATOID ARTHRITIS INVOLVING MULTIPLE SITES WITH POSITIVE RHEUMATOID FACTOR (HCC): Primary | ICD-10-CM

## 2025-05-22 PROCEDURE — 2580000003 HC RX 258: Performed by: NURSE PRACTITIONER

## 2025-05-22 PROCEDURE — 6360000002 HC RX W HCPCS: Performed by: NURSE PRACTITIONER

## 2025-05-22 PROCEDURE — 96365 THER/PROPH/DIAG IV INF INIT: CPT

## 2025-05-22 PROCEDURE — 6370000000 HC RX 637 (ALT 250 FOR IP): Performed by: NURSE PRACTITIONER

## 2025-05-22 PROCEDURE — 2500000003 HC RX 250 WO HCPCS: Performed by: NURSE PRACTITIONER

## 2025-05-22 PROCEDURE — 99211 OFF/OP EST MAY X REQ PHY/QHP: CPT

## 2025-05-22 RX ORDER — SODIUM CHLORIDE 0.9 % (FLUSH) 0.9 %
5-40 SYRINGE (ML) INJECTION PRN
Status: DISCONTINUED | OUTPATIENT
Start: 2025-05-22 | End: 2025-05-23 | Stop reason: HOSPADM

## 2025-05-22 RX ORDER — EPINEPHRINE 1 MG/ML
0.3 INJECTION, SOLUTION INTRAMUSCULAR; SUBCUTANEOUS PRN
OUTPATIENT
Start: 2025-07-17

## 2025-05-22 RX ORDER — SODIUM CHLORIDE 9 MG/ML
INJECTION, SOLUTION INTRAVENOUS CONTINUOUS
OUTPATIENT
Start: 2025-07-17

## 2025-05-22 RX ORDER — ONDANSETRON 2 MG/ML
8 INJECTION INTRAMUSCULAR; INTRAVENOUS
OUTPATIENT
Start: 2025-07-17

## 2025-05-22 RX ORDER — HEPARIN 100 UNIT/ML
500 SYRINGE INTRAVENOUS PRN
OUTPATIENT
Start: 2025-07-17

## 2025-05-22 RX ORDER — HYDROCORTISONE SODIUM SUCCINATE 100 MG/2ML
100 INJECTION INTRAMUSCULAR; INTRAVENOUS
OUTPATIENT
Start: 2025-07-17

## 2025-05-22 RX ORDER — SODIUM CHLORIDE 9 MG/ML
5-250 INJECTION, SOLUTION INTRAVENOUS PRN
OUTPATIENT
Start: 2025-07-17

## 2025-05-22 RX ORDER — DIPHENHYDRAMINE HYDROCHLORIDE 50 MG/ML
50 INJECTION, SOLUTION INTRAMUSCULAR; INTRAVENOUS
OUTPATIENT
Start: 2025-07-17

## 2025-05-22 RX ORDER — ACETAMINOPHEN 325 MG/1
650 TABLET ORAL ONCE
Status: COMPLETED | OUTPATIENT
Start: 2025-05-22 | End: 2025-05-22

## 2025-05-22 RX ORDER — DIPHENHYDRAMINE HCL 25 MG
25 TABLET ORAL ONCE
Status: COMPLETED | OUTPATIENT
Start: 2025-05-22 | End: 2025-05-22

## 2025-05-22 RX ORDER — ACETAMINOPHEN 325 MG/1
650 TABLET ORAL
OUTPATIENT
Start: 2025-07-17

## 2025-05-22 RX ORDER — ALBUTEROL SULFATE 90 UG/1
4 INHALANT RESPIRATORY (INHALATION) PRN
OUTPATIENT
Start: 2025-07-17

## 2025-05-22 RX ORDER — ACETAMINOPHEN 325 MG/1
650 TABLET ORAL ONCE
OUTPATIENT
Start: 2025-07-17

## 2025-05-22 RX ORDER — DIPHENHYDRAMINE HCL 25 MG
25 TABLET ORAL ONCE
OUTPATIENT
Start: 2025-07-17 | End: 2025-07-17

## 2025-05-22 RX ORDER — SODIUM CHLORIDE 0.9 % (FLUSH) 0.9 %
5-40 SYRINGE (ML) INJECTION PRN
OUTPATIENT
Start: 2025-07-17

## 2025-05-22 RX ADMIN — ACETAMINOPHEN 650 MG: 325 TABLET ORAL at 11:52

## 2025-05-22 RX ADMIN — INFLIXIMAB 200 MG: 100 INJECTION, POWDER, LYOPHILIZED, FOR SOLUTION INTRAVENOUS at 12:45

## 2025-05-22 RX ADMIN — DIPHENHYDRAMINE HYDROCHLORIDE 25 MG: 25 TABLET ORAL at 11:52

## 2025-05-22 RX ADMIN — SODIUM CHLORIDE, PRESERVATIVE FREE 10 ML: 5 INJECTION INTRAVENOUS at 11:53

## 2025-05-22 RX ADMIN — SODIUM CHLORIDE, PRESERVATIVE FREE 10 ML: 5 INJECTION INTRAVENOUS at 13:45

## 2025-05-22 NOTE — PROGRESS NOTES
1130: Patient arrived ambulatory for Renflexis infusion. Patient rights and responsibilities offered to patient.  Patient denies any antibiotic usage or infection at this time.      1152: Pre-medications administered. Patient tolerated well.     : Renflexis infusion started. Patient tolerating well.      : Infusion complete, patient tolerated well. No concerns voiced.   AVS reviewed with patient, voiced understanding. Patient discharged ambulatory.                           _m___ Safety:       (Environmental)  [place] to environment  Ensure ID band is correct and in place/ allergy band as needed  Assess for fall risk  Initiate fall precautions as applicable (fall band, side rails, etc.)  Call light within reach  Bed in low position/ wheels locked     _m___ Pain:       Assess pain level and characteristics  Administer analgesics as ordered  Assess effectiveness of pain management and report to MD as needed     _m___ Knowledge Deficit:  Assess baseline knowledge  Provide teaching at level of understanding  Provide teaching via preferred learning method  Evaluate teaching effectiveness     _m___ Hemodynamic/Respiratory Status:                  (Pre and Post Procedure Monitoring)  Assess/Monitor vital signs and LOC  Assess Baseline SpO2 prior to any sedation  Obtain weight/height  Assess vital signs/ LOC until patient meets discharge criteria  Monitor procedure site and notify MD of any issues

## 2025-05-22 NOTE — DISCHARGE INSTRUCTIONS
RENFLEXIS DISCHARGE INSTRUCTION SHEET      Can not have an active infection or be on an antibiotic at time of infusion.       SIDE EFFECTS: RASH, COUGH, JOINT PAIN, SWELLING TO HANDS/FEET, SORE THROAT, FEVER      Call your doctor or return to the nearest Emergency Room if you start having shortness of breath, chest tightness, wheezing      Next Renflexis infusion is scheduled on: July 17th at 11:30 AM      Please call [phone] with any questions or concerns.

## 2025-06-08 DIAGNOSIS — M06.30 RHEUMATOID NODULOSIS (HCC): ICD-10-CM

## 2025-06-08 DIAGNOSIS — M05.79 RHEUMATOID ARTHRITIS INVOLVING MULTIPLE SITES WITH POSITIVE RHEUMATOID FACTOR (HCC): ICD-10-CM

## 2025-06-09 RX ORDER — METHOTREXATE 2.5 MG/1
TABLET ORAL
Qty: 32 TABLET | Refills: 1 | OUTPATIENT
Start: 2025-06-09

## 2025-06-10 LAB
ALBUMIN: 4.1 G/DL
ALP BLD-CCNC: 71 U/L
ALT SERPL-CCNC: 18 U/L
ANION GAP SERPL CALCULATED.3IONS-SCNC: NORMAL MMOL/L
AST SERPL-CCNC: 20 U/L
BASOPHILS ABSOLUTE: 0 /ΜL
BASOPHILS RELATIVE PERCENT: 1 %
BILIRUB SERPL-MCNC: 0.7 MG/DL (ref 0.1–1.4)
BUN BLDV-MCNC: 19 MG/DL
C-REACTIVE PROTEIN: 1
CALCIUM SERPL-MCNC: 9 MG/DL
CHLORIDE BLD-SCNC: 103 MMOL/L
CO2: 21 MMOL/L
CREAT SERPL-MCNC: 0.66 MG/DL
EOSINOPHILS ABSOLUTE: 0.3 /ΜL
EOSINOPHILS RELATIVE PERCENT: 9 %
GFR, ESTIMATED: 96
GLUCOSE BLD-MCNC: 82 MG/DL
HCT VFR BLD CALC: 39.5 % (ref 36–46)
HEMOGLOBIN: 12.9 G/DL (ref 12–16)
LYMPHOCYTES ABSOLUTE: 1 /ΜL
LYMPHOCYTES RELATIVE PERCENT: 26 %
MCH RBC QN AUTO: 32 PG
MCHC RBC AUTO-ENTMCNC: 32.7 G/DL
MCV RBC AUTO: 98 FL
MONOCYTES ABSOLUTE: 0.4 /ΜL
MONOCYTES RELATIVE PERCENT: 11 %
NEUTROPHILS ABSOLUTE: 2 /ΜL
NEUTROPHILS RELATIVE PERCENT: 53 %
PDW BLD-RTO: 14 %
PLATELET # BLD: 277 K/ΜL
PMV BLD AUTO: NORMAL FL
POTASSIUM SERPL-SCNC: 4.3 MMOL/L
RBC # BLD: 4.03 10^6/ΜL
SED RATE, AUTOMATED: 32
SODIUM BLD-SCNC: 138 MMOL/L
TOTAL PROTEIN: 6.4 G/DL (ref 6.4–8.2)
WBC # BLD: 3.8 10^3/ML

## 2025-06-11 ENCOUNTER — RESULTS FOLLOW-UP (OUTPATIENT)
Age: 65
End: 2025-06-11

## 2025-06-11 DIAGNOSIS — M05.79 RHEUMATOID ARTHRITIS INVOLVING MULTIPLE SITES WITH POSITIVE RHEUMATOID FACTOR (HCC): ICD-10-CM

## 2025-06-11 DIAGNOSIS — M06.30 RHEUMATOID NODULOSIS (HCC): ICD-10-CM

## 2025-06-11 RX ORDER — METHOTREXATE 2.5 MG/1
TABLET ORAL
Qty: 32 TABLET | Refills: 2 | Status: SHIPPED | OUTPATIENT
Start: 2025-06-11

## 2025-06-23 RX ORDER — MELOXICAM 15 MG/1
15 TABLET ORAL DAILY PRN
Qty: 30 TABLET | Refills: 5 | Status: SHIPPED | OUTPATIENT
Start: 2025-06-23

## 2025-07-17 ENCOUNTER — HOSPITAL ENCOUNTER (OUTPATIENT)
Dept: NURSING | Age: 65
Discharge: HOME | End: 2025-07-17
Payer: MEDICARE

## 2025-07-17 VITALS
SYSTOLIC BLOOD PRESSURE: 121 MMHG | HEART RATE: 77 BPM | DIASTOLIC BLOOD PRESSURE: 59 MMHG | RESPIRATION RATE: 16 BRPM | WEIGHT: 128 LBS | BODY MASS INDEX: 24.2 KG/M2 | TEMPERATURE: 96.9 F | OXYGEN SATURATION: 95 %

## 2025-07-17 DIAGNOSIS — M05.79 RHEUMATOID ARTHRITIS INVOLVING MULTIPLE SITES WITH POSITIVE RHEUMATOID FACTOR (HCC): Primary | ICD-10-CM

## 2025-07-17 PROCEDURE — 96365 THER/PROPH/DIAG IV INF INIT: CPT

## 2025-07-17 PROCEDURE — 6370000000 HC RX 637 (ALT 250 FOR IP): Performed by: NURSE PRACTITIONER

## 2025-07-17 PROCEDURE — 2580000003 HC RX 258: Performed by: NURSE PRACTITIONER

## 2025-07-17 PROCEDURE — 6360000002 HC RX W HCPCS: Performed by: NURSE PRACTITIONER

## 2025-07-17 PROCEDURE — 99211 OFF/OP EST MAY X REQ PHY/QHP: CPT

## 2025-07-17 RX ORDER — ONDANSETRON 2 MG/ML
8 INJECTION INTRAMUSCULAR; INTRAVENOUS
OUTPATIENT
Start: 2025-09-11

## 2025-07-17 RX ORDER — ACETAMINOPHEN 325 MG/1
650 TABLET ORAL ONCE
Status: COMPLETED | OUTPATIENT
Start: 2025-07-17 | End: 2025-07-17

## 2025-07-17 RX ORDER — SODIUM CHLORIDE 9 MG/ML
INJECTION, SOLUTION INTRAVENOUS CONTINUOUS
OUTPATIENT
Start: 2025-09-11

## 2025-07-17 RX ORDER — ACETAMINOPHEN 325 MG/1
650 TABLET ORAL ONCE
OUTPATIENT
Start: 2025-09-11

## 2025-07-17 RX ORDER — SODIUM CHLORIDE 0.9 % (FLUSH) 0.9 %
5-40 SYRINGE (ML) INJECTION PRN
OUTPATIENT
Start: 2025-09-11

## 2025-07-17 RX ORDER — HEPARIN 100 UNIT/ML
500 SYRINGE INTRAVENOUS PRN
OUTPATIENT
Start: 2025-09-11

## 2025-07-17 RX ORDER — DIPHENHYDRAMINE HYDROCHLORIDE 50 MG/ML
50 INJECTION, SOLUTION INTRAMUSCULAR; INTRAVENOUS
OUTPATIENT
Start: 2025-09-11

## 2025-07-17 RX ORDER — ALBUTEROL SULFATE 90 UG/1
4 INHALANT RESPIRATORY (INHALATION) PRN
OUTPATIENT
Start: 2025-09-11

## 2025-07-17 RX ORDER — DIPHENHYDRAMINE HCL 25 MG
25 TABLET ORAL ONCE
OUTPATIENT
Start: 2025-09-11 | End: 2025-09-11

## 2025-07-17 RX ORDER — DIPHENHYDRAMINE HCL 25 MG
25 TABLET ORAL ONCE
Status: COMPLETED | OUTPATIENT
Start: 2025-07-17 | End: 2025-07-17

## 2025-07-17 RX ORDER — EPINEPHRINE 1 MG/ML
0.3 INJECTION, SOLUTION INTRAMUSCULAR; SUBCUTANEOUS PRN
OUTPATIENT
Start: 2025-09-11

## 2025-07-17 RX ORDER — HYDROCORTISONE SODIUM SUCCINATE 100 MG/2ML
100 INJECTION INTRAMUSCULAR; INTRAVENOUS
OUTPATIENT
Start: 2025-09-11

## 2025-07-17 RX ORDER — SODIUM CHLORIDE 9 MG/ML
5-250 INJECTION, SOLUTION INTRAVENOUS PRN
Status: DISCONTINUED | OUTPATIENT
Start: 2025-07-17 | End: 2025-07-18 | Stop reason: HOSPADM

## 2025-07-17 RX ORDER — ACETAMINOPHEN 325 MG/1
650 TABLET ORAL
OUTPATIENT
Start: 2025-09-11

## 2025-07-17 RX ORDER — SODIUM CHLORIDE 9 MG/ML
5-250 INJECTION, SOLUTION INTRAVENOUS PRN
OUTPATIENT
Start: 2025-09-11

## 2025-07-17 RX ADMIN — INFLIXIMAB 200 MG: 100 INJECTION, POWDER, LYOPHILIZED, FOR SOLUTION INTRAVENOUS at 12:33

## 2025-07-17 RX ADMIN — DIPHENHYDRAMINE HYDROCHLORIDE 25 MG: 25 TABLET ORAL at 11:45

## 2025-07-17 RX ADMIN — ACETAMINOPHEN 650 MG: 325 TABLET ORAL at 11:45

## 2025-07-17 ASSESSMENT — PAIN - FUNCTIONAL ASSESSMENT: PAIN_FUNCTIONAL_ASSESSMENT: NONE - DENIES PAIN

## 2025-07-17 NOTE — PROGRESS NOTES
1128 Patient arrived to [facility] ambulatory for renflexis infusion.  Oriented to room and call light  PT RIGHTS AND RESPONSIBILITIES OFFERED TO PT.  She denies recent infection or antibiotic use    1145 Pre-medications given and she denies complaints     1233 Medication started and she denies complaints    1336 Medication completed an she denies complaints.  Iv removed.  Discharge instructions given and explained and she denies questions.  Discharged ambulatory       _M___ Safety:       (Environmental)  [place] to environment  Ensure ID band is correct and in place/ allergy band as needed  Assess for fall risk  Initiate fall precautions as applicable (fall band, side rails, etc.)  Call light within reach  Bed in low position/ wheels locked    _M___ Pain:       Assess pain level and characteristics  Administer analgesics as ordered  Assess effectiveness of pain management and report to MD as needed    _M___ Knowledge Deficit:  Assess baseline knowledge  Provide teaching at level of understanding  Provide teaching via preferred learning method  Evaluate teaching effectiveness    _M___ Hemodynamic/Respiratory Status:       (Pre and Post Procedure Monitoring)  Assess/Monitor vital signs and LOC  Assess Baseline SpO2 prior to any sedation  Obtain weight/height  Assess vital signs/ LOC until patient meets discharge criteria  Monitor procedure site and notify MD of any issues

## 2025-07-17 NOTE — DISCHARGE INSTRUCTIONS
RENFLEXIS DISCHARGE INSTRUCTION SHEET      Can not have an active infection or be on an antibiotic at time of infusion.       SIDE EFFECTS: RASH, COUGH, JOINT PAIN, SWELLING TO HANDS/FEET, SORE THROAT, FEVER      Call your doctor or return to the nearest Emergency Room if you start having shortness of breath, chest tightness, wheezing      Next Renflexis infusion is scheduled on: September 11 at 11:30      Please call [phone] with any questions or concerns.